# Patient Record
Sex: MALE | Race: WHITE | NOT HISPANIC OR LATINO
[De-identification: names, ages, dates, MRNs, and addresses within clinical notes are randomized per-mention and may not be internally consistent; named-entity substitution may affect disease eponyms.]

---

## 2021-01-05 ENCOUNTER — APPOINTMENT (OUTPATIENT)
Dept: SURGERY | Facility: CLINIC | Age: 61
End: 2021-01-05
Payer: COMMERCIAL

## 2021-01-05 PROCEDURE — 99204 OFFICE O/P NEW MOD 45 MIN: CPT

## 2021-01-05 PROCEDURE — 99072 ADDL SUPL MATRL&STAF TM PHE: CPT

## 2021-01-20 ENCOUNTER — OUTPATIENT (OUTPATIENT)
Dept: OUTPATIENT SERVICES | Facility: HOSPITAL | Age: 61
LOS: 1 days | End: 2021-01-20
Payer: COMMERCIAL

## 2021-01-20 VITALS
OXYGEN SATURATION: 98 % | SYSTOLIC BLOOD PRESSURE: 121 MMHG | HEART RATE: 60 BPM | WEIGHT: 216.05 LBS | TEMPERATURE: 98 F | RESPIRATION RATE: 16 BRPM | DIASTOLIC BLOOD PRESSURE: 81 MMHG

## 2021-01-20 DIAGNOSIS — D21.11 BENIGN NEOPLASM OF CONNECTIVE AND OTHER SOFT TISSUE OF RIGHT UPPER LIMB, INCLUDING SHOULDER: ICD-10-CM

## 2021-01-20 DIAGNOSIS — Z98.890 OTHER SPECIFIED POSTPROCEDURAL STATES: Chronic | ICD-10-CM

## 2021-01-20 DIAGNOSIS — S54.00XA INJURY OF ULNAR NERVE AT FOREARM LEVEL, UNSPECIFIED ARM, INITIAL ENCOUNTER: Chronic | ICD-10-CM

## 2021-01-20 DIAGNOSIS — Z01.818 ENCOUNTER FOR OTHER PREPROCEDURAL EXAMINATION: ICD-10-CM

## 2021-01-20 DIAGNOSIS — G47.33 OBSTRUCTIVE SLEEP APNEA (ADULT) (PEDIATRIC): ICD-10-CM

## 2021-01-20 LAB
ALBUMIN SERPL ELPH-MCNC: 3.8 G/DL — SIGNIFICANT CHANGE UP (ref 3.3–5)
ALP SERPL-CCNC: 52 U/L — SIGNIFICANT CHANGE UP (ref 40–120)
ALT FLD-CCNC: 19 U/L — SIGNIFICANT CHANGE UP (ref 12–78)
ANION GAP SERPL CALC-SCNC: 4 MMOL/L — LOW (ref 5–17)
AST SERPL-CCNC: 15 U/L — SIGNIFICANT CHANGE UP (ref 15–37)
BILIRUB SERPL-MCNC: 0.5 MG/DL — SIGNIFICANT CHANGE UP (ref 0.2–1.2)
BUN SERPL-MCNC: 21 MG/DL — SIGNIFICANT CHANGE UP (ref 7–23)
CALCIUM SERPL-MCNC: 8.8 MG/DL — SIGNIFICANT CHANGE UP (ref 8.5–10.1)
CHLORIDE SERPL-SCNC: 108 MMOL/L — SIGNIFICANT CHANGE UP (ref 96–108)
CO2 SERPL-SCNC: 29 MMOL/L — SIGNIFICANT CHANGE UP (ref 22–31)
CREAT SERPL-MCNC: 0.94 MG/DL — SIGNIFICANT CHANGE UP (ref 0.5–1.3)
GLUCOSE SERPL-MCNC: 103 MG/DL — HIGH (ref 70–99)
HCT VFR BLD CALC: 40.4 % — SIGNIFICANT CHANGE UP (ref 39–50)
HGB BLD-MCNC: 13.8 G/DL — SIGNIFICANT CHANGE UP (ref 13–17)
MCHC RBC-ENTMCNC: 30.1 PG — SIGNIFICANT CHANGE UP (ref 27–34)
MCHC RBC-ENTMCNC: 34.2 GM/DL — SIGNIFICANT CHANGE UP (ref 32–36)
MCV RBC AUTO: 88 FL — SIGNIFICANT CHANGE UP (ref 80–100)
NRBC # BLD: 0 /100 WBCS — SIGNIFICANT CHANGE UP (ref 0–0)
PLATELET # BLD AUTO: 211 K/UL — SIGNIFICANT CHANGE UP (ref 150–400)
POTASSIUM SERPL-MCNC: 4.3 MMOL/L — SIGNIFICANT CHANGE UP (ref 3.5–5.3)
POTASSIUM SERPL-SCNC: 4.3 MMOL/L — SIGNIFICANT CHANGE UP (ref 3.5–5.3)
PROT SERPL-MCNC: 7.3 G/DL — SIGNIFICANT CHANGE UP (ref 6–8.3)
RBC # BLD: 4.59 M/UL — SIGNIFICANT CHANGE UP (ref 4.2–5.8)
RBC # FLD: 12.4 % — SIGNIFICANT CHANGE UP (ref 10.3–14.5)
SODIUM SERPL-SCNC: 141 MMOL/L — SIGNIFICANT CHANGE UP (ref 135–145)
WBC # BLD: 4.73 K/UL — SIGNIFICANT CHANGE UP (ref 3.8–10.5)
WBC # FLD AUTO: 4.73 K/UL — SIGNIFICANT CHANGE UP (ref 3.8–10.5)

## 2021-01-20 PROCEDURE — G0463: CPT

## 2021-01-20 PROCEDURE — 80053 COMPREHEN METABOLIC PANEL: CPT

## 2021-01-20 PROCEDURE — 93010 ELECTROCARDIOGRAM REPORT: CPT

## 2021-01-20 PROCEDURE — 36415 COLL VENOUS BLD VENIPUNCTURE: CPT

## 2021-01-20 PROCEDURE — 85027 COMPLETE CBC AUTOMATED: CPT

## 2021-01-20 PROCEDURE — 93005 ELECTROCARDIOGRAM TRACING: CPT

## 2021-01-20 NOTE — H&P PST ADULT - NSICDXPROBLEM_GEN_ALL_CORE_FT
PROBLEM DIAGNOSES  Problem: Preoperative testing  Assessment and Plan: Nop medical clearance needed as per surgeon. EKG unchaged from 7/2020. Dr Pineda (anesthesiologist) aware. CBC, Comprehensive panel and EKG ordered. Pre-op instructions and surgical scrubs given and pt verbalized understanding. COVID19 PCR testing to be done within 72 hours of surgery at [facility].     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       Problem: DINESH on CPAP  Assessment and Plan: Pt informed of need for extended stay post op if deemed necessary by anesthesiologist. Pt verbalized understanding.

## 2021-01-20 NOTE — H&P PST ADULT - HISTORY OF PRESENT ILLNESS
Pt first noticed "growth behind right shoulder" in 6/2020. Pt s/p sonogram and diagnosed with lipoma. Pt seen by dermatologist and was attempted to be removed in office "but it came back". Pt reports to occasional discomfort when moving right arm. Pt denies pain, infection and drainage from lipoma.  59 y/o male with PMH of DINESH here for PST. Pt first noticed "growth behind right shoulder" in 6/2020. Pt s/p sonogram and diagnosed with lipoma. Pt seen by dermatologist and lipoma was attempted to be removed in office "but it came back". Pt reports to occasional discomfort when moving right arm. Pt denies pain, infection and drainage from lipoma. Pt electing for excision right shoulder mass on 2/3/2021.

## 2021-01-20 NOTE — H&P PST ADULT - GENERAL COMMENTS
Pt denies history of travel outside the country and outside [facility], denies having had the COVID19 infection and denies COVID19 positive contacts within the last 14 days.

## 2021-01-20 NOTE — H&P PST ADULT - NSICDXPASTMEDICALHX_GEN_ALL_CORE_FT
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

## 2021-01-20 NOTE — H&P PST ADULT - SKIN COMMENTS
(+) large mass to posterior right shoulder - moveable, non-tender to palpation, no erythema, no drainage

## 2021-01-20 NOTE — H&P PST ADULT - NSICDXPASTSURGICALHX_GEN_ALL_CORE_FT
PAST SURGICAL HISTORY:  Damage to ulnar nerve (Ulnar nerve transposition, 2019)    S/P ACL reconstruction (Left knee, 2016)    S/P colonoscopy

## 2021-01-29 PROBLEM — G62.9 POLYNEUROPATHY, UNSPECIFIED: Chronic | Status: ACTIVE | Noted: 2021-01-20

## 2021-01-29 PROBLEM — G47.33 OBSTRUCTIVE SLEEP APNEA (ADULT) (PEDIATRIC): Chronic | Status: ACTIVE | Noted: 2021-01-20

## 2021-01-29 PROBLEM — F41.9 ANXIETY DISORDER, UNSPECIFIED: Chronic | Status: ACTIVE | Noted: 2021-01-20

## 2021-01-29 PROBLEM — C61 MALIGNANT NEOPLASM OF PROSTATE: Chronic | Status: ACTIVE | Noted: 2021-01-20

## 2021-01-29 PROBLEM — D21.11 BENIGN NEOPLASM OF CONNECTIVE AND OTHER SOFT TISSUE OF RIGHT UPPER LIMB, INCLUDING SHOULDER: Chronic | Status: ACTIVE | Noted: 2021-01-20

## 2021-01-31 ENCOUNTER — OUTPATIENT (OUTPATIENT)
Dept: OUTPATIENT SERVICES | Facility: HOSPITAL | Age: 61
LOS: 1 days | End: 2021-01-31
Payer: COMMERCIAL

## 2021-01-31 DIAGNOSIS — Z20.828 CONTACT WITH AND (SUSPECTED) EXPOSURE TO OTHER VIRAL COMMUNICABLE DISEASES: ICD-10-CM

## 2021-01-31 DIAGNOSIS — Z98.890 OTHER SPECIFIED POSTPROCEDURAL STATES: Chronic | ICD-10-CM

## 2021-01-31 DIAGNOSIS — S54.00XA INJURY OF ULNAR NERVE AT FOREARM LEVEL, UNSPECIFIED ARM, INITIAL ENCOUNTER: Chronic | ICD-10-CM

## 2021-01-31 LAB — SARS-COV-2 RNA SPEC QL NAA+PROBE: SIGNIFICANT CHANGE UP

## 2021-01-31 PROCEDURE — U0005: CPT

## 2021-01-31 PROCEDURE — U0003: CPT

## 2021-02-02 ENCOUNTER — TRANSCRIPTION ENCOUNTER (OUTPATIENT)
Age: 61
End: 2021-02-02

## 2021-02-02 NOTE — ASU PATIENT PROFILE, ADULT - PSH
Damage to ulnar nerve  (Ulnar nerve transposition, 2019)  S/P ACL reconstruction  (Left knee, 2016)  S/P colonoscopy

## 2021-02-02 NOTE — ASU PATIENT PROFILE, ADULT - PMH
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

## 2021-02-03 ENCOUNTER — APPOINTMENT (OUTPATIENT)
Dept: SURGERY | Facility: HOSPITAL | Age: 61
End: 2021-02-03
Payer: COMMERCIAL

## 2021-02-03 ENCOUNTER — OUTPATIENT (OUTPATIENT)
Dept: OUTPATIENT SERVICES | Facility: HOSPITAL | Age: 61
LOS: 1 days | End: 2021-02-03
Payer: COMMERCIAL

## 2021-02-03 ENCOUNTER — RESULT REVIEW (OUTPATIENT)
Age: 61
End: 2021-02-03

## 2021-02-03 VITALS
OXYGEN SATURATION: 97 % | SYSTOLIC BLOOD PRESSURE: 116 MMHG | HEART RATE: 70 BPM | RESPIRATION RATE: 14 BRPM | TEMPERATURE: 98 F | WEIGHT: 216.05 LBS | DIASTOLIC BLOOD PRESSURE: 73 MMHG

## 2021-02-03 VITALS
OXYGEN SATURATION: 97 % | DIASTOLIC BLOOD PRESSURE: 78 MMHG | SYSTOLIC BLOOD PRESSURE: 140 MMHG | RESPIRATION RATE: 14 BRPM | HEART RATE: 67 BPM

## 2021-02-03 DIAGNOSIS — D21.11 BENIGN NEOPLASM OF CONNECTIVE AND OTHER SOFT TISSUE OF RIGHT UPPER LIMB, INCLUDING SHOULDER: ICD-10-CM

## 2021-02-03 DIAGNOSIS — Z98.890 OTHER SPECIFIED POSTPROCEDURAL STATES: Chronic | ICD-10-CM

## 2021-02-03 DIAGNOSIS — S54.00XA INJURY OF ULNAR NERVE AT FOREARM LEVEL, UNSPECIFIED ARM, INITIAL ENCOUNTER: Chronic | ICD-10-CM

## 2021-02-03 DIAGNOSIS — Z01.818 ENCOUNTER FOR OTHER PREPROCEDURAL EXAMINATION: ICD-10-CM

## 2021-02-03 PROCEDURE — 88304 TISSUE EXAM BY PATHOLOGIST: CPT

## 2021-02-03 PROCEDURE — 23073 EXC SHOULDER TUM DEEP 5 CM/>: CPT | Mod: RT

## 2021-02-03 PROCEDURE — 88304 TISSUE EXAM BY PATHOLOGIST: CPT | Mod: 26

## 2021-02-03 PROCEDURE — ZZZZZ: CPT

## 2021-02-03 PROCEDURE — 99072 ADDL SUPL MATRL&STAF TM PHE: CPT

## 2021-02-03 RX ORDER — HYDROMORPHONE HYDROCHLORIDE 2 MG/ML
0.5 INJECTION INTRAMUSCULAR; INTRAVENOUS; SUBCUTANEOUS
Refills: 0 | Status: DISCONTINUED | OUTPATIENT
Start: 2021-02-03 | End: 2021-02-03

## 2021-02-03 RX ORDER — SODIUM CHLORIDE 9 MG/ML
1000 INJECTION, SOLUTION INTRAVENOUS
Refills: 0 | Status: DISCONTINUED | OUTPATIENT
Start: 2021-02-03 | End: 2021-02-03

## 2021-02-03 RX ORDER — HYDROCODONE BITARTRATE AND ACETAMINOPHEN 7.5; 325 MG/15ML; MG/15ML
1 SOLUTION ORAL
Qty: 12 | Refills: 0
Start: 2021-02-03

## 2021-02-03 RX ORDER — OXYCODONE HYDROCHLORIDE 5 MG/1
5 TABLET ORAL ONCE
Refills: 0 | Status: DISCONTINUED | OUTPATIENT
Start: 2021-02-03 | End: 2021-02-03

## 2021-02-03 RX ORDER — CEFAZOLIN SODIUM 1 G
1000 VIAL (EA) INJECTION ONCE
Refills: 0 | Status: COMPLETED | OUTPATIENT
Start: 2021-02-03 | End: 2021-02-03

## 2021-02-03 RX ORDER — ONDANSETRON 8 MG/1
4 TABLET, FILM COATED ORAL ONCE
Refills: 0 | Status: DISCONTINUED | OUTPATIENT
Start: 2021-02-03 | End: 2021-02-03

## 2021-02-03 RX ADMIN — SODIUM CHLORIDE 75 MILLILITER(S): 9 INJECTION, SOLUTION INTRAVENOUS at 11:44

## 2021-02-03 RX ADMIN — SODIUM CHLORIDE 75 MILLILITER(S): 9 INJECTION, SOLUTION INTRAVENOUS at 14:19

## 2021-02-03 NOTE — ASU DISCHARGE PLAN (ADULT/PEDIATRIC) - CALL YOUR DOCTOR IF YOU HAVE ANY OF THE FOLLOWING:
Bleeding that does not stop/Pain not relieved by Medications/Nausea and vomiting that does not stop/Unable to urinate/Inability to tolerate liquids or foods

## 2021-02-03 NOTE — ASU DISCHARGE PLAN (ADULT/PEDIATRIC) - CARE PROVIDER_API CALL
Kd Serrano (DO)  Surgery  [address]  [place], [place] [id number]  Phone: [phone]  Fax: [phone]  Follow Up Time: 1 week

## 2021-02-03 NOTE — BRIEF OPERATIVE NOTE - NSICDXBRIEFOPLAUNCH_GEN_ALL_CORE
Arterial    Diagnosis: a-fib    Patient location during procedure: done in OR  Procedure start time: 9/30/2019 1:56 PM  Timeout: 9/30/2019 1:55 PM  Procedure end time: 9/30/2019 2:01 PM    Staffing  Authorizing Provider: Foreign Louis Jr., MD  Performing Provider: Foreign Louis Jr., MD    Anesthesiologist was present at the time of the procedure.    Preanesthetic Checklist  Completed: patient identified, site marked, surgical consent, pre-op evaluation, timeout performed, IV checked, risks and benefits discussed, monitors and equipment checked and anesthesia consent givenArterial  Skin Prep: chlorhexidine gluconate  Local Infiltration: none  Orientation: right  Location: radial  Catheter Size: 20 G  Catheter placement by Anatomical landmarks. Heme positive aspiration all ports.Insertion Attempts: 1  Assessment  Dressing: secured with tape and tegaderm  Patient: Tolerated well            
<--- Click to Launch ICDx for PreOp, PostOp and Procedure

## 2021-02-08 LAB — SURGICAL PATHOLOGY STUDY: SIGNIFICANT CHANGE UP

## 2021-02-11 ENCOUNTER — APPOINTMENT (OUTPATIENT)
Dept: SURGERY | Facility: CLINIC | Age: 61
End: 2021-02-11

## 2021-05-09 ENCOUNTER — EMERGENCY (EMERGENCY)
Facility: HOSPITAL | Age: 61
LOS: 1 days | Discharge: HOME | End: 2021-05-09
Attending: INTERNAL MEDICINE | Admitting: INTERNAL MEDICINE
Payer: COMMERCIAL

## 2021-05-09 VITALS
SYSTOLIC BLOOD PRESSURE: 143 MMHG | DIASTOLIC BLOOD PRESSURE: 72 MMHG | RESPIRATION RATE: 18 BRPM | OXYGEN SATURATION: 99 % | WEIGHT: 214.95 LBS | HEART RATE: 67 BPM

## 2021-05-09 VITALS
SYSTOLIC BLOOD PRESSURE: 127 MMHG | OXYGEN SATURATION: 100 % | DIASTOLIC BLOOD PRESSURE: 70 MMHG | TEMPERATURE: 97 F | HEART RATE: 58 BPM | RESPIRATION RATE: 16 BRPM

## 2021-05-09 DIAGNOSIS — S54.00XA INJURY OF ULNAR NERVE AT FOREARM LEVEL, UNSPECIFIED ARM, INITIAL ENCOUNTER: Chronic | ICD-10-CM

## 2021-05-09 DIAGNOSIS — Z98.890 OTHER SPECIFIED POSTPROCEDURAL STATES: Chronic | ICD-10-CM

## 2021-05-09 LAB
ALBUMIN SERPL ELPH-MCNC: 4 G/DL — SIGNIFICANT CHANGE UP (ref 3.3–5)
ALP SERPL-CCNC: 48 U/L — SIGNIFICANT CHANGE UP (ref 40–120)
ALT FLD-CCNC: 20 U/L — SIGNIFICANT CHANGE UP (ref 12–78)
ANION GAP SERPL CALC-SCNC: 6 MMOL/L — SIGNIFICANT CHANGE UP (ref 5–17)
APPEARANCE UR: CLEAR — SIGNIFICANT CHANGE UP
AST SERPL-CCNC: 16 U/L — SIGNIFICANT CHANGE UP (ref 15–37)
BACTERIA # UR AUTO: NEGATIVE — SIGNIFICANT CHANGE UP
BILIRUB SERPL-MCNC: 0.4 MG/DL — SIGNIFICANT CHANGE UP (ref 0.2–1.2)
BILIRUB UR-MCNC: NEGATIVE — SIGNIFICANT CHANGE UP
BUN SERPL-MCNC: 22 MG/DL — SIGNIFICANT CHANGE UP (ref 7–23)
CALCIUM SERPL-MCNC: 8.8 MG/DL — SIGNIFICANT CHANGE UP (ref 8.5–10.1)
CHLORIDE SERPL-SCNC: 108 MMOL/L — SIGNIFICANT CHANGE UP (ref 96–108)
CO2 SERPL-SCNC: 26 MMOL/L — SIGNIFICANT CHANGE UP (ref 22–31)
COLOR SPEC: YELLOW — SIGNIFICANT CHANGE UP
CREAT SERPL-MCNC: 1.4 MG/DL — HIGH (ref 0.5–1.3)
DIFF PNL FLD: ABNORMAL
EPI CELLS # UR: NEGATIVE — SIGNIFICANT CHANGE UP
GLUCOSE SERPL-MCNC: 138 MG/DL — HIGH (ref 70–99)
GLUCOSE UR QL: NEGATIVE — SIGNIFICANT CHANGE UP
HCT VFR BLD CALC: 42.2 % — SIGNIFICANT CHANGE UP (ref 39–50)
HGB BLD-MCNC: 14.5 G/DL — SIGNIFICANT CHANGE UP (ref 13–17)
KETONES UR-MCNC: NEGATIVE — SIGNIFICANT CHANGE UP
LEUKOCYTE ESTERASE UR-ACNC: NEGATIVE — SIGNIFICANT CHANGE UP
MCHC RBC-ENTMCNC: 29.6 PG — SIGNIFICANT CHANGE UP (ref 27–34)
MCHC RBC-ENTMCNC: 34.4 GM/DL — SIGNIFICANT CHANGE UP (ref 32–36)
MCV RBC AUTO: 86.1 FL — SIGNIFICANT CHANGE UP (ref 80–100)
NITRITE UR-MCNC: NEGATIVE — SIGNIFICANT CHANGE UP
NRBC # BLD: 0 /100 WBCS — SIGNIFICANT CHANGE UP (ref 0–0)
PH UR: 5 — SIGNIFICANT CHANGE UP (ref 5–8)
PLATELET # BLD AUTO: 194 K/UL — SIGNIFICANT CHANGE UP (ref 150–400)
POTASSIUM SERPL-MCNC: 3.9 MMOL/L — SIGNIFICANT CHANGE UP (ref 3.5–5.3)
POTASSIUM SERPL-SCNC: 3.9 MMOL/L — SIGNIFICANT CHANGE UP (ref 3.5–5.3)
PROT SERPL-MCNC: 7.2 G/DL — SIGNIFICANT CHANGE UP (ref 6–8.3)
PROT UR-MCNC: 15
RBC # BLD: 4.9 M/UL — SIGNIFICANT CHANGE UP (ref 4.2–5.8)
RBC # FLD: 12.3 % — SIGNIFICANT CHANGE UP (ref 10.3–14.5)
RBC CASTS # UR COMP ASSIST: ABNORMAL /HPF (ref 0–4)
SODIUM SERPL-SCNC: 140 MMOL/L — SIGNIFICANT CHANGE UP (ref 135–145)
SP GR SPEC: 1.02 — SIGNIFICANT CHANGE UP (ref 1.01–1.02)
UROBILINOGEN FLD QL: NEGATIVE — SIGNIFICANT CHANGE UP
WBC # BLD: 11.05 K/UL — HIGH (ref 3.8–10.5)
WBC # FLD AUTO: 11.05 K/UL — HIGH (ref 3.8–10.5)
WBC UR QL: SIGNIFICANT CHANGE UP

## 2021-05-09 PROCEDURE — 81001 URINALYSIS AUTO W/SCOPE: CPT

## 2021-05-09 PROCEDURE — 99285 EMERGENCY DEPT VISIT HI MDM: CPT

## 2021-05-09 PROCEDURE — 74176 CT ABD & PELVIS W/O CONTRAST: CPT | Mod: 26,MA

## 2021-05-09 PROCEDURE — 74176 CT ABD & PELVIS W/O CONTRAST: CPT

## 2021-05-09 PROCEDURE — 99284 EMERGENCY DEPT VISIT MOD MDM: CPT | Mod: 25

## 2021-05-09 PROCEDURE — 85027 COMPLETE CBC AUTOMATED: CPT

## 2021-05-09 PROCEDURE — 96376 TX/PRO/DX INJ SAME DRUG ADON: CPT

## 2021-05-09 PROCEDURE — 80053 COMPREHEN METABOLIC PANEL: CPT

## 2021-05-09 PROCEDURE — 36415 COLL VENOUS BLD VENIPUNCTURE: CPT

## 2021-05-09 PROCEDURE — 96374 THER/PROPH/DIAG INJ IV PUSH: CPT

## 2021-05-09 PROCEDURE — 96375 TX/PRO/DX INJ NEW DRUG ADDON: CPT

## 2021-05-09 PROCEDURE — 99053 MED SERV 10PM-8AM 24 HR FAC: CPT

## 2021-05-09 RX ORDER — HYDROMORPHONE HYDROCHLORIDE 2 MG/ML
0.5 INJECTION INTRAMUSCULAR; INTRAVENOUS; SUBCUTANEOUS ONCE
Refills: 0 | Status: DISCONTINUED | OUTPATIENT
Start: 2021-05-09 | End: 2021-05-09

## 2021-05-09 RX ORDER — TAMSULOSIN HYDROCHLORIDE 0.4 MG/1
1 CAPSULE ORAL
Qty: 30 | Refills: 0
Start: 2021-05-09 | End: 2021-06-07

## 2021-05-09 RX ORDER — KETOROLAC TROMETHAMINE 30 MG/ML
30 SYRINGE (ML) INJECTION ONCE
Refills: 0 | Status: DISCONTINUED | OUTPATIENT
Start: 2021-05-09 | End: 2021-05-09

## 2021-05-09 RX ORDER — OXYCODONE AND ACETAMINOPHEN 5; 325 MG/1; MG/1
1 TABLET ORAL
Qty: 12 | Refills: 0
Start: 2021-05-09 | End: 2021-05-11

## 2021-05-09 RX ORDER — ONDANSETRON 8 MG/1
4 TABLET, FILM COATED ORAL ONCE
Refills: 0 | Status: COMPLETED | OUTPATIENT
Start: 2021-05-09 | End: 2021-05-09

## 2021-05-09 RX ORDER — SODIUM CHLORIDE 9 MG/ML
1000 INJECTION INTRAMUSCULAR; INTRAVENOUS; SUBCUTANEOUS ONCE
Refills: 0 | Status: COMPLETED | OUTPATIENT
Start: 2021-05-09 | End: 2021-05-09

## 2021-05-09 RX ORDER — CEFUROXIME AXETIL 250 MG
1 TABLET ORAL
Qty: 20 | Refills: 0
Start: 2021-05-09 | End: 2021-05-18

## 2021-05-09 RX ORDER — TAMSULOSIN HYDROCHLORIDE 0.4 MG/1
0.4 CAPSULE ORAL AT BEDTIME
Refills: 0 | Status: DISCONTINUED | OUTPATIENT
Start: 2021-05-09 | End: 2021-05-12

## 2021-05-09 RX ADMIN — SODIUM CHLORIDE 1000 MILLILITER(S): 9 INJECTION INTRAMUSCULAR; INTRAVENOUS; SUBCUTANEOUS at 05:54

## 2021-05-09 RX ADMIN — Medication 30 MILLIGRAM(S): at 05:41

## 2021-05-09 RX ADMIN — HYDROMORPHONE HYDROCHLORIDE 0.5 MILLIGRAM(S): 2 INJECTION INTRAMUSCULAR; INTRAVENOUS; SUBCUTANEOUS at 04:43

## 2021-05-09 RX ADMIN — Medication 30 MILLIGRAM(S): at 06:03

## 2021-05-09 RX ADMIN — HYDROMORPHONE HYDROCHLORIDE 0.5 MILLIGRAM(S): 2 INJECTION INTRAMUSCULAR; INTRAVENOUS; SUBCUTANEOUS at 05:02

## 2021-05-09 RX ADMIN — TAMSULOSIN HYDROCHLORIDE 0.4 MILLIGRAM(S): 0.4 CAPSULE ORAL at 06:01

## 2021-05-09 RX ADMIN — SODIUM CHLORIDE 1000 MILLILITER(S): 9 INJECTION INTRAMUSCULAR; INTRAVENOUS; SUBCUTANEOUS at 04:43

## 2021-05-09 RX ADMIN — ONDANSETRON 4 MILLIGRAM(S): 8 TABLET, FILM COATED ORAL at 05:41

## 2021-05-09 RX ADMIN — ONDANSETRON 4 MILLIGRAM(S): 8 TABLET, FILM COATED ORAL at 04:43

## 2021-05-09 NOTE — ED PROVIDER NOTE - PATIENT PORTAL LINK FT
You can access the FollowMyHealth Patient Portal offered by [facility] by registering at the following website: [email or web address]. By joining [facility]’s FollowMyHealth portal, you will also be able to view your health information using other applications (apps) compatible with our system.

## 2021-05-09 NOTE — ED PROVIDER NOTE - CARE PROVIDER_API CALL
Kurt Willett  UROLOGY  [address], [address]  [place] [address]  Phone: [phone]  Fax: [phone]  Follow Up Time: 1-3 Days

## 2021-05-09 NOTE — ED ADULT NURSE NOTE - OBJECTIVE STATEMENT
Pt comes from triage. Pt reports right flank pain since 6:30pm with vomiting and reports of dribbling urine. Pt breathing shallow due to pain. Pt ambulatory with a steady gait. Pt bladder scanned for 14ml present.

## 2021-05-09 NOTE — ED PROVIDER NOTE - OBJECTIVE STATEMENT
62 y/o male with right flank pain , back pain at 6PM that intensified this morning with nausea, no vomiting, no fever, no chills, no urinary symptoms.

## 2021-05-09 NOTE — ED PROVIDER NOTE - NSFOLLOWUPINSTRUCTIONS_ED_ALL_ED_FT
1) Follow-up with your Primary Medical Doctor or referred doctor. Call today / next business day for prompt follow-up.  2) Return to Emergency room for any worsening or persistent pain, weakness,  vomiting, diarrhea, unable to eat / drink, weak or dizzy, or any other concerning symptoms.   -If you have ANY FEVER, you must return to ED immediately  3) See attached instruction sheets for additional information, including information regarding signs and symptoms to look out for, reasons to seek immediate care and other important instructions.  4) Follow-up with Urology, see attached. Call ASAP for appointment.  5) Ceftin antibiotic and FLOMAX as directed   6) Percocet as needed for pain, no driving / operating heavy machinery

## 2021-08-05 ENCOUNTER — TRANSCRIPTION ENCOUNTER (OUTPATIENT)
Age: 61
End: 2021-08-05

## 2022-05-17 NOTE — H&P PST ADULT - SOURCE OF INFORMATION, PROFILE
Patient currently denies any COVID symptoms. No COVID test needed. Patient instructed to call GI office if any new COVID symptoms or contacts to Mary before procedure.
patient

## 2022-10-03 NOTE — ED ADULT NURSE NOTE - CHPI ED NUR SYMPTOMS NEG
Caller: JAMAALEDDIE    Relationship: Emergency Contact    Best call back number: [phone]    Requested Prescriptions: GLUCOSE TEST STRIPS    [id number] [address] [place], [place] [address] AT [facility]  [place] - [phone]  - [phone] FX     Additional details provided by patient: THE PATIENT'S SON STATES THAT A GLUCOSE MONITOR WAS CALLED IN FOR THE PATIENT, BUT THE TEST STRIPS WERE NOT. PLEASE ADVISE.     Does the patient have less than a 3 day supply:  [x] Yes  [] No    Ac Gerardo Rep   10/03/22 12:20 EDT   no abdominal distension/no blood in stool/no burning urination/no chills/no diarrhea/no fever/no hematuria

## 2022-12-24 ENCOUNTER — INPATIENT (INPATIENT)
Facility: HOSPITAL | Age: 62
LOS: 1 days | Discharge: HOME | DRG: 69 | End: 2022-12-26
Attending: STUDENT IN AN ORGANIZED HEALTH CARE EDUCATION/TRAINING PROGRAM | Admitting: STUDENT IN AN ORGANIZED HEALTH CARE EDUCATION/TRAINING PROGRAM
Payer: COMMERCIAL

## 2022-12-24 VITALS
RESPIRATION RATE: 17 BRPM | DIASTOLIC BLOOD PRESSURE: 53 MMHG | OXYGEN SATURATION: 99 % | HEIGHT: 74 IN | WEIGHT: 214.95 LBS | SYSTOLIC BLOOD PRESSURE: 116 MMHG | TEMPERATURE: 97 F | HEART RATE: 85 BPM

## 2022-12-24 DIAGNOSIS — G45.9 TRANSIENT CEREBRAL ISCHEMIC ATTACK, UNSPECIFIED: ICD-10-CM

## 2022-12-24 DIAGNOSIS — F41.9 ANXIETY DISORDER, UNSPECIFIED: ICD-10-CM

## 2022-12-24 DIAGNOSIS — S62.001A UNSPECIFIED FRACTURE OF NAVICULAR [SCAPHOID] BONE OF RIGHT WRIST, INITIAL ENCOUNTER FOR CLOSED FRACTURE: Chronic | ICD-10-CM

## 2022-12-24 DIAGNOSIS — M19.90 UNSPECIFIED OSTEOARTHRITIS, UNSPECIFIED SITE: ICD-10-CM

## 2022-12-24 DIAGNOSIS — Z98.890 OTHER SPECIFIED POSTPROCEDURAL STATES: Chronic | ICD-10-CM

## 2022-12-24 DIAGNOSIS — G47.33 OBSTRUCTIVE SLEEP APNEA (ADULT) (PEDIATRIC): ICD-10-CM

## 2022-12-24 DIAGNOSIS — R73.9 HYPERGLYCEMIA, UNSPECIFIED: ICD-10-CM

## 2022-12-24 DIAGNOSIS — S54.00XA INJURY OF ULNAR NERVE AT FOREARM LEVEL, UNSPECIFIED ARM, INITIAL ENCOUNTER: Chronic | ICD-10-CM

## 2022-12-24 DIAGNOSIS — Z29.9 ENCOUNTER FOR PROPHYLACTIC MEASURES, UNSPECIFIED: ICD-10-CM

## 2022-12-24 LAB
ALBUMIN SERPL ELPH-MCNC: 3.8 G/DL — SIGNIFICANT CHANGE UP (ref 3.3–5)
ALP SERPL-CCNC: 49 U/L — SIGNIFICANT CHANGE UP (ref 40–120)
ALT FLD-CCNC: 21 U/L — SIGNIFICANT CHANGE UP (ref 12–78)
AMPHET UR-MCNC: NEGATIVE — SIGNIFICANT CHANGE UP
ANION GAP SERPL CALC-SCNC: 5 MMOL/L — SIGNIFICANT CHANGE UP (ref 5–17)
APPEARANCE UR: CLEAR — SIGNIFICANT CHANGE UP
APTT BLD: 36.7 SEC — HIGH (ref 27.5–35.5)
AST SERPL-CCNC: 17 U/L — SIGNIFICANT CHANGE UP (ref 15–37)
BARBITURATES UR SCN-MCNC: NEGATIVE — SIGNIFICANT CHANGE UP
BASOPHILS # BLD AUTO: 0.03 K/UL — SIGNIFICANT CHANGE UP (ref 0–0.2)
BASOPHILS NFR BLD AUTO: 0.5 % — SIGNIFICANT CHANGE UP (ref 0–2)
BENZODIAZ UR-MCNC: NEGATIVE — SIGNIFICANT CHANGE UP
BILIRUB SERPL-MCNC: 0.3 MG/DL — SIGNIFICANT CHANGE UP (ref 0.2–1.2)
BILIRUB UR-MCNC: NEGATIVE — SIGNIFICANT CHANGE UP
BLD GP AB SCN SERPL QL: SIGNIFICANT CHANGE UP
BUN SERPL-MCNC: 20 MG/DL — SIGNIFICANT CHANGE UP (ref 7–23)
CALCIUM SERPL-MCNC: 9.1 MG/DL — SIGNIFICANT CHANGE UP (ref 8.5–10.1)
CHLORIDE SERPL-SCNC: 109 MMOL/L — HIGH (ref 96–108)
CO2 SERPL-SCNC: 28 MMOL/L — SIGNIFICANT CHANGE UP (ref 22–31)
COCAINE METAB.OTHER UR-MCNC: NEGATIVE — SIGNIFICANT CHANGE UP
COLOR SPEC: SIGNIFICANT CHANGE UP
CREAT SERPL-MCNC: 1.1 MG/DL — SIGNIFICANT CHANGE UP (ref 0.5–1.3)
DIFF PNL FLD: NEGATIVE — SIGNIFICANT CHANGE UP
EGFR: 76 ML/MIN/1.73M2 — SIGNIFICANT CHANGE UP
EOSINOPHIL # BLD AUTO: 0.21 K/UL — SIGNIFICANT CHANGE UP (ref 0–0.5)
EOSINOPHIL NFR BLD AUTO: 3.7 % — SIGNIFICANT CHANGE UP (ref 0–6)
ETHANOL SERPL-MCNC: <10 MG/DL — SIGNIFICANT CHANGE UP (ref 0–10)
FLUAV AG NPH QL: SIGNIFICANT CHANGE UP
FLUBV AG NPH QL: SIGNIFICANT CHANGE UP
GLUCOSE SERPL-MCNC: 120 MG/DL — HIGH (ref 70–99)
GLUCOSE UR QL: NEGATIVE — SIGNIFICANT CHANGE UP
HCT VFR BLD CALC: 41.2 % — SIGNIFICANT CHANGE UP (ref 39–50)
HGB BLD-MCNC: 14.2 G/DL — SIGNIFICANT CHANGE UP (ref 13–17)
IMM GRANULOCYTES NFR BLD AUTO: 0.3 % — SIGNIFICANT CHANGE UP (ref 0–0.9)
INR BLD: 1.03 RATIO — SIGNIFICANT CHANGE UP (ref 0.88–1.16)
KETONES UR-MCNC: NEGATIVE — SIGNIFICANT CHANGE UP
LEUKOCYTE ESTERASE UR-ACNC: NEGATIVE — SIGNIFICANT CHANGE UP
LYMPHOCYTES # BLD AUTO: 1.5 K/UL — SIGNIFICANT CHANGE UP (ref 1–3.3)
LYMPHOCYTES # BLD AUTO: 26.1 % — SIGNIFICANT CHANGE UP (ref 13–44)
MCHC RBC-ENTMCNC: 30 PG — SIGNIFICANT CHANGE UP (ref 27–34)
MCHC RBC-ENTMCNC: 34.5 GM/DL — SIGNIFICANT CHANGE UP (ref 32–36)
MCV RBC AUTO: 87.1 FL — SIGNIFICANT CHANGE UP (ref 80–100)
METHADONE UR-MCNC: NEGATIVE — SIGNIFICANT CHANGE UP
MONOCYTES # BLD AUTO: 0.55 K/UL — SIGNIFICANT CHANGE UP (ref 0–0.9)
MONOCYTES NFR BLD AUTO: 9.6 % — SIGNIFICANT CHANGE UP (ref 2–14)
NEUTROPHILS # BLD AUTO: 3.43 K/UL — SIGNIFICANT CHANGE UP (ref 1.8–7.4)
NEUTROPHILS NFR BLD AUTO: 59.8 % — SIGNIFICANT CHANGE UP (ref 43–77)
NITRITE UR-MCNC: NEGATIVE — SIGNIFICANT CHANGE UP
NRBC # BLD: 0 /100 WBCS — SIGNIFICANT CHANGE UP (ref 0–0)
OPIATES UR-MCNC: NEGATIVE — SIGNIFICANT CHANGE UP
PCP SPEC-MCNC: SIGNIFICANT CHANGE UP
PCP UR-MCNC: NEGATIVE — SIGNIFICANT CHANGE UP
PH UR: 5 — SIGNIFICANT CHANGE UP (ref 5–8)
PLATELET # BLD AUTO: 187 K/UL — SIGNIFICANT CHANGE UP (ref 150–400)
POTASSIUM SERPL-MCNC: 4.1 MMOL/L — SIGNIFICANT CHANGE UP (ref 3.5–5.3)
POTASSIUM SERPL-SCNC: 4.1 MMOL/L — SIGNIFICANT CHANGE UP (ref 3.5–5.3)
PROT SERPL-MCNC: 7.3 G/DL — SIGNIFICANT CHANGE UP (ref 6–8.3)
PROT UR-MCNC: NEGATIVE — SIGNIFICANT CHANGE UP
PROTHROM AB SERPL-ACNC: 12.1 SEC — SIGNIFICANT CHANGE UP (ref 10.5–13.4)
RBC # BLD: 4.73 M/UL — SIGNIFICANT CHANGE UP (ref 4.2–5.8)
RBC # FLD: 12.2 % — SIGNIFICANT CHANGE UP (ref 10.3–14.5)
RSV RNA NPH QL NAA+NON-PROBE: SIGNIFICANT CHANGE UP
SARS-COV-2 RNA SPEC QL NAA+PROBE: SIGNIFICANT CHANGE UP
SODIUM SERPL-SCNC: 142 MMOL/L — SIGNIFICANT CHANGE UP (ref 135–145)
SP GR SPEC: 1.01 — SIGNIFICANT CHANGE UP (ref 1.01–1.02)
THC UR QL: NEGATIVE — SIGNIFICANT CHANGE UP
TROPONIN I, HIGH SENSITIVITY RESULT: 4.9 NG/L — SIGNIFICANT CHANGE UP
UROBILINOGEN FLD QL: NEGATIVE — SIGNIFICANT CHANGE UP
WBC # BLD: 5.74 K/UL — SIGNIFICANT CHANGE UP (ref 3.8–10.5)
WBC # FLD AUTO: 5.74 K/UL — SIGNIFICANT CHANGE UP (ref 3.8–10.5)

## 2022-12-24 PROCEDURE — 99223 1ST HOSP IP/OBS HIGH 75: CPT | Mod: GC

## 2022-12-24 PROCEDURE — 99285 EMERGENCY DEPT VISIT HI MDM: CPT

## 2022-12-24 PROCEDURE — 70498 CT ANGIOGRAPHY NECK: CPT | Mod: 26,MA

## 2022-12-24 PROCEDURE — 93010 ELECTROCARDIOGRAM REPORT: CPT

## 2022-12-24 PROCEDURE — 0042T: CPT | Mod: MA

## 2022-12-24 PROCEDURE — 71045 X-RAY EXAM CHEST 1 VIEW: CPT | Mod: 26

## 2022-12-24 PROCEDURE — 70496 CT ANGIOGRAPHY HEAD: CPT | Mod: 26,MA

## 2022-12-24 RX ORDER — SERTRALINE 25 MG/1
50 TABLET, FILM COATED ORAL DAILY
Refills: 0 | Status: DISCONTINUED | OUTPATIENT
Start: 2022-12-24 | End: 2022-12-26

## 2022-12-24 RX ORDER — ATORVASTATIN CALCIUM 80 MG/1
40 TABLET, FILM COATED ORAL AT BEDTIME
Refills: 0 | Status: DISCONTINUED | OUTPATIENT
Start: 2022-12-24 | End: 2022-12-26

## 2022-12-24 RX ORDER — ENOXAPARIN SODIUM 100 MG/ML
40 INJECTION SUBCUTANEOUS EVERY 24 HOURS
Refills: 0 | Status: DISCONTINUED | OUTPATIENT
Start: 2022-12-24 | End: 2022-12-26

## 2022-12-24 RX ORDER — GABAPENTIN 400 MG/1
300 CAPSULE ORAL
Qty: 0 | Refills: 0 | DISCHARGE

## 2022-12-24 RX ORDER — ONDANSETRON 8 MG/1
4 TABLET, FILM COATED ORAL EVERY 8 HOURS
Refills: 0 | Status: DISCONTINUED | OUTPATIENT
Start: 2022-12-24 | End: 2022-12-26

## 2022-12-24 RX ORDER — GABAPENTIN 400 MG/1
300 CAPSULE ORAL
Refills: 0 | Status: DISCONTINUED | OUTPATIENT
Start: 2022-12-24 | End: 2022-12-26

## 2022-12-24 RX ORDER — ACETAMINOPHEN 500 MG
650 TABLET ORAL EVERY 6 HOURS
Refills: 0 | Status: DISCONTINUED | OUTPATIENT
Start: 2022-12-24 | End: 2022-12-24

## 2022-12-24 RX ORDER — LANOLIN ALCOHOL/MO/W.PET/CERES
3 CREAM (GRAM) TOPICAL AT BEDTIME
Refills: 0 | Status: DISCONTINUED | OUTPATIENT
Start: 2022-12-24 | End: 2022-12-24

## 2022-12-24 RX ORDER — ASPIRIN/CALCIUM CARB/MAGNESIUM 324 MG
324 TABLET ORAL ONCE
Refills: 0 | Status: COMPLETED | OUTPATIENT
Start: 2022-12-24 | End: 2022-12-24

## 2022-12-24 RX ORDER — GABAPENTIN 400 MG/1
0 CAPSULE ORAL
Qty: 0 | Refills: 0 | DISCHARGE

## 2022-12-24 RX ORDER — ASPIRIN/CALCIUM CARB/MAGNESIUM 324 MG
81 TABLET ORAL DAILY
Refills: 0 | Status: DISCONTINUED | OUTPATIENT
Start: 2022-12-25 | End: 2022-12-26

## 2022-12-24 RX ORDER — CELECOXIB 200 MG/1
200 CAPSULE ORAL DAILY
Refills: 0 | Status: DISCONTINUED | OUTPATIENT
Start: 2022-12-24 | End: 2022-12-26

## 2022-12-24 NOTE — ED ADULT NURSE NOTE - PERIPHERAL VASCULAR
Aria Carballo     - - -

## 2022-12-24 NOTE — H&P ADULT - HISTORY OF PRESENT ILLNESS
IN THE ED:  Temp  97.3 F , HR 85 ,  / 53 ,RR 17 , SpO2 99 on RA  EKG:  Labs unremarkable  Imaging:   CT head non con: No large territory acute infarct, intracranial hemorrhage, or mass effect. Mild chronic microangiopathic white matter changes present.  CTA HEAD/NECK: No large vessel occlusion, aneurysm, dissection, or hemodynamically flow-limiting stenosis present.  CT PERFUSION: Limited by artifact. To the visualized extent, no convincing evidence of core infarct or ischemic penumbra. Subtle patchy ground-glass opacities at the bilateral apices, limited motion artifact, although may represent early infiltrative/infectious process.   63 y/o M w/ PMHx of osteoarthritis, anxiety, prostate cancer s/p radiation trx, and multiple orthopedic surgeries/chronic issues presented to the ED prompted by family members endorsing gait instability, difficulty standing up, and slurred speech. Patient was at a family holiday party when family members noticed these changes. Per patient he arrived at this family party around 4pm and symptoms were noticed when arising from a chair at around 4:30 PM. Patient fell well and wanted to just rest it off, but an EMS family member took the patient's BP and noted systolic 180s. Per ED note patient was last seen well by his wife at 3 PM. Brought into hospital by wife. NIHSS stroke scale in ED 0, and all symptoms showed steady improvement. Upon admission patient with complete symptom resolution, no slurring, normal gait, reports feeling well and endorses no complaints. This constellation of symptoms is a first time for the patient. Upon further discussion patient reports feelings of unsteadiness in the days leading up to admission as well as an intermittent left sided whooshing sound in his left ear only noticeable in a quiet room. Patient endorses some left sided chest pain in the past several weeks though not on exertion, patient works out 3 times a week with no CP on exertion. Patient denies current CP, SOB, palpitations weakness/numbness/tingling, sensory deficits.     IN THE ED:  Temp  97.3 F , HR 85 ,  / 53 ,RR 17 , SpO2 99 on RA  EKG: NSR. Rate 74. RBBB.  Labs unremarkable  Imaging:   CT head non con: No large territory acute infarct, intracranial hemorrhage, or mass effect. Mild chronic microangiopathic white matter changes present.  CTA HEAD/NECK: No large vessel occlusion, aneurysm, dissection, or hemodynamically flow-limiting stenosis present.  CT PERFUSION: Limited by artifact. To the visualized extent, no convincing evidence of core infarct or ischemic penumbra. Subtle patchy ground-glass opacities at the bilateral apices, limited motion artifact, although may represent early infiltrative/infectious process.   61 y/o M w/ PMHx of osteoarthritis, anxiety, prostate cancer s/p radiation trx, and multiple orthopedic surgeries/chronic issues presented to the ED prompted by family members endorsing gait instability, difficulty standing up, and slurred speech. Patient was at a family holiday party when family members noticed these changes. Per patient he arrived at this family party around 4pm and symptoms were noticed when arising from a chair at around 4:30 PM. Patient fell well and wanted to just rest it off, but an EMS family member took the patient's BP and noted systolic 180s. Per ED note patient was last seen well by his wife at 3 PM. Brought into hospital by wife. NIHSS stroke scale in ED 0, and all symptoms showed steady improvement. Upon admission patient with complete symptom resolution, no slurring, normal gait, reports feeling well and endorses no complaints. This constellation of symptoms is a first time for the patient. Upon further discussion, patient reports feelings of unsteadiness in the days leading up to admission as well as an intermittent left sided whooshing sound in his left ear only noticeable in a quiet room. Patient endorses some left sided chest pain in the past several weeks though not on exertion, patient works out 3 times a week with no CP on exertion. Patient denies current CP, SOB, palpitations weakness/numbness/tingling, sensory deficits.     IN THE ED:  Temp  97.3 F , HR 85 ,  / 53 ,RR 17 , SpO2 99 on RA  EKG: NSR. Rate 74. RBBB.  Labs unremarkable  Imaging:   CXR: clear lungs on personal review  CT head non con: No large territory acute infarct, intracranial hemorrhage, or mass effect. Mild chronic microangiopathic white matter changes present.  CTA HEAD/NECK: No large vessel occlusion, aneurysm, dissection, or hemodynamically flow-limiting stenosis present.  CT PERFUSION: Limited by artifact. To the visualized extent, no convincing evidence of core infarct or ischemic penumbra. Subtle patchy ground-glass opacities at the bilateral apices, limited motion artifact, although may represent early infiltrative/infectious process.

## 2022-12-24 NOTE — ED ADULT NURSE NOTE - OBJECTIVE STATEMENT
Pt is A&Ox4, pt presented to the ER for stroke like symptoms at home, loss of balance, tired, weakness, numbness, stroke call called, pt taken to CT, labs drawn as per MD orders, neuro intact, sensation +, motor strength  +, denies any pain at this moment, denies any blurry vision, sob, chest pain, dizziness, headache at this moment, pt appears in nad, resp even and unlabored, will continue to monitor

## 2022-12-24 NOTE — H&P ADULT - ATTENDING COMMENTS
63 y/o M w/ PMHx of osteoarthritis, anxiety, prostate cancer s/p radiation trx, and multiple orthopedic surgeries/chronic issues presented to the ED prompted by family members endorsing patient experiencing gait instability, difficulty standing up, and slurred speech s/p complete symptom resolution admitted for TIA/CVA work-up. Admit to telemetry to monitor for arrhythmia. EKG with RBBB. Neurochecks q4, Check A1C, lipid panel. Check MR brain and 2D ECHO. Neurology consult when available. D/w patient at bedside. Likely TIA - start ASA/statin. Current plan is for MRI and neurology evaluation on Monday 12/26.    Agree with H&P as outlined above, edited where appropriate.

## 2022-12-24 NOTE — ED PROVIDER NOTE - ENMT, MLM
Airway patent, Nasal mucosa clear. Mouth with normal mucosa. Throat has no vesicles, no oropharyngeal exudates and uvula is midline. he speaks with a lisp, but this is not new  per wife speech is unchanged from baseline.

## 2022-12-24 NOTE — H&P ADULT - PROBLEM SELECTOR PLAN 5
Lovenox 40mg qD for DVT prophylaxis          #DISPO  PT consult Lovenox 40mg qD for DVT prophylaxis        #DISPO  PT consult CPAP ordered

## 2022-12-24 NOTE — ED ADULT TRIAGE NOTE - BEFAST ARM SIDE DRIFT
How Severe Are Your Spot(S)?: mild Have Your Spot(S) Been Treated In The Past?: has not been treated Hpi Title: Evaluation of Skin Lesions Location: right anterior proximal upper arm Year Removed: 2015 No

## 2022-12-24 NOTE — H&P ADULT - PROBLEM SELECTOR PLAN 1
Patient with gait instability, difficulty standing up, and slurred speech, sx resolved on admission  CT Head w/o: No large territory acute infarct, intracranial hemorrhage, or mass effect. Mild chronic microangiopathic white matter changes present.  CTA HEAD/NECK: No large vessel occlusion, aneurysm, dissection, or hemodynamically flow-limiting stenosis.  CT PERFUSION: No convincing evidence of core infarct or ischemic penumbra. Subtle patchy ground-glass opacities at the bilateral apices, limited motion artifact, although may represent early infiltrative/infectious process.  EKG on admission NSR. Rate 74. RBBB.  Admit to telemetry for cardiac monitoring to r/o occult arrythmia  MR brain  TTE  Lipid panel   followed by 81mg QD Patient with gait instability, difficulty standing up, and slurred speech, sx resolved on admission  CT Head w/o: No large territory acute infarct, intracranial hemorrhage, or mass effect. Mild chronic microangiopathic white matter changes present.  CTA HEAD/NECK: No large vessel occlusion, aneurysm, dissection, or hemodynamically flow-limiting stenosis.  CT PERFUSION: No convincing evidence of core infarct or ischemic penumbra. Subtle patchy ground-glass opacities at the bilateral apices, limited motion artifact, although may represent early infiltrative/infectious process.  EKG on admission NSR. Rate 74. RBBB.  Admit to telemetry for cardiac monitoring to r/o occult arrythmia  MR brain  TTE  Lipid panel  Atorvastatin 40 QD   now followed by 81mg QD starting tomorrow

## 2022-12-24 NOTE — ED PROVIDER NOTE - NSICDXPASTMEDICALHX_GEN_ALL_CORE_FT
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

## 2022-12-24 NOTE — H&P ADULT - NSICDXPASTSURGICALHX_GEN_ALL_CORE_FT
PAST SURGICAL HISTORY:  Damage to ulnar nerve (Ulnar nerve transposition, 2019)    Fracture of scaphoid of right wrist     S/P ACL reconstruction (Left knee, 2016)    S/P colonoscopy

## 2022-12-24 NOTE — H&P ADULT - NSHPSOCIALHISTORY_GEN_ALL_CORE
Lives at home with wife and 2 children  ADLs: Capable, no limitations  Ambulates independently  Alcohol: 3-4 drinks / month  Tobacco: None/never  Drugs: None/never

## 2022-12-24 NOTE — ED PROVIDER NOTE - OBJECTIVE STATEMENT
lkw 3 pm usha 3 pm   code stroke activated on arrival to er evaluation-  Patient is a 62-year-old male who has history of multiple orthopedic surgeries and issues, no significant past medical history whose primary care physician is Dr. Helton.  He was last seen well by his wife at 3 PM.  She went to a party.  He showed up a few hours later, his speech was off, his gait was off, and he did not feel well.  So she brought him to the emergency room.  Since arrival to the ER his speech has begun to improve, his gait has begun to improve, and the patient states he feels little bit better.  He had no chest pain or shortness of breath.  No weakness no numbness.  No trauma.  No rash.  No history of same.  He takes gabapentin for his nerve pain secondary to his orthopedic injuries.  He takes no blood thinners.  No aspirin.  No recent surgeries.

## 2022-12-24 NOTE — ED PROVIDER NOTE - CLINICAL SUMMARY MEDICAL DECISION MAKING FREE TEXT BOX
62-year-old male on gabapentin for chronic pain, history of anxiety according to medical history, social drinker perhaps 1 drink per day at most.  Found to be unsteady on his feet with slurring of his speech last known well 3 PM.  Only beginning to get better now here in the emergency room.  Differential includes TIA, stroke, arrhythmia, hypotension, metabolic derangement including hypocalcemia hypokalemia.  Rule out anemia.  Rule out head injury rule out neck brain tumor.  Rule out COVID or the flu.  Rule out pneumonia.  We will obtain an emergent CAT scan including CT angio with perfusion.  This imaging will include the carotid Dopplers the brainstem imaging as well as regular brain perfusion.  Laboratory studies troponin chemistries, urinalysis alcohol level chest x-ray EKG.  Case will be discussed with neuroradiology emergently for the results of the CAT scan.

## 2022-12-24 NOTE — H&P ADULT - NSHPREVIEWOFSYSTEMS_GEN_ALL_CORE
Constitutional: denies fever, chills  HEENT: denies headache, dizziness, or lightheadedness  Respiratory: denies SOB, cough, or wheezing  Cardiovascular: denies CP, palpitations  Gastrointestinal: denies nausea, vomiting, diarrhea, constipation, abdominal pain  Skin/Breast: denies rashes or itching  Musculoskeletal: denies weakness  Neurologic: denies loss of sensation, numbness, or tingling  ROS negative except as noted above Constitutional: denies fever, chills  HEENT: denies headache, dizziness, or lightheadedness  Respiratory: denies SOB, cough, or wheezing  Cardiovascular: denies CP, palpitations  Gastrointestinal: denies nausea, vomiting, diarrhea, constipation, abdominal pain  Skin/Breast: denies rashes or itching  Musculoskeletal: denies weakness  Neurologic: denies loss of sensation, numbness, or tingling; neuro symptoms in HPI back to baseline  Psych: no recent changes in mood

## 2022-12-24 NOTE — ED ADULT NURSE NOTE - IS THE PATIENT ABLE TO BE SCREENED?
From: Li Brown  To: Frankie Warner Yes

## 2022-12-24 NOTE — ED PROVIDER NOTE - PROGRESS NOTE DETAILS
dw radiology  no acute hemorrhage infarct thrombus or mass dw pt and family  will accept saba pt and family- results of tests ct cta ctp provided  will accept saba hospitalist

## 2022-12-24 NOTE — H&P ADULT - ASSESSMENT
61 y/o M w/ PMHx of osteoarthritis, anxiety, prostate cancer s/p radiation trx, and multiple orthopedic surgeries/chronic issues presented to the ED prompted by family members endorsing patient experiencing gait instability, difficulty standing up, and slurred speech s/p complete symptom resolution admitted for TIA work-up. 61 y/o M w/ PMHx of osteoarthritis, anxiety, prostate cancer s/p radiation trx, and multiple orthopedic surgeries/chronic issues presented to the ED prompted by family members endorsing patient experiencing gait instability, difficulty standing up, and slurred speech s/p complete symptom resolution admitted for TIA/CVA work-up.

## 2022-12-24 NOTE — H&P ADULT - NSHPPHYSICALEXAM_GEN_ALL_CORE
GENERAL: patient appears well, no acute distress, appropriate, pleasant  EYES: sclera clear, no exudates  ENMT: oropharynx clear without erythema, no exudates, moist mucous membranes  LUNGS: good air entry bilaterally, clear to auscultation, symmetric breath sounds, no wheezing or rhonchi appreciated  HEART: soft S1/S2, regular rate and rhythm, no murmurs noted, no lower extremity edema  GASTROINTESTINAL: abdomen is soft, nontender, nondistended, no palpable masses  INTEGUMENT: Good skin turgor, warm skin, appears well perfused  MUSCULOSKELETAL: no clubbing or cyanosis  NEUROLOGIC: CN II - XII grossly intact. Strength 5/5 b/l upper and lower extremities and equal b/l. No sensory deficits. Speech not slurred.   PSYCHIATRIC: mood is good, affect is congruent, linear and logical thought process  HEME/LYMPH: no obvious ecchymosis or petechiae Vital Signs Last 24 Hrs  T(C): 36.6 (24 Dec 2022 23:27), Max: 36.6 (24 Dec 2022 23:27)  T(F): 97.9 (24 Dec 2022 23:27), Max: 97.9 (24 Dec 2022 23:27)  HR: 75 (24 Dec 2022 23:27) (75 - 85)  BP: 122/68 (24 Dec 2022 23:27) (116/53 - 122/68)  RR: 16 (24 Dec 2022 23:27) (16 - 17)  SpO2: 97% (24 Dec 2022 23:27) (97% - 99%)    GENERAL: patient appears well, no acute distress, appropriate, pleasant  EYES: sclera clear, no exudates  ENMT: oropharynx clear without erythema, no exudates, moist mucous membranes  LUNGS: good air entry bilaterally, clear to auscultation, symmetric breath sounds, no wheezing or rhonchi appreciated  HEART: soft S1/S2, regular rate and rhythm, no murmurs noted, no lower extremity edema  GASTROINTESTINAL: abdomen is soft, nontender, nondistended, no palpable masses  INTEGUMENT: Good skin turgor, warm skin, appears well perfused  MUSCULOSKELETAL: no clubbing or cyanosis  NEUROLOGIC: CN II - XII grossly intact. Strength 5/5 b/l upper and lower extremities and equal b/l. No sensory deficits. Speech not slurred.   PSYCHIATRIC: mood is good, affect is congruent, linear and logical thought process  HEME/LYMPH: no obvious ecchymosis or petechiae

## 2022-12-25 LAB
A1C WITH ESTIMATED AVERAGE GLUCOSE RESULT: 5.5 % — SIGNIFICANT CHANGE UP (ref 4–5.6)
ALBUMIN SERPL ELPH-MCNC: 3.3 G/DL — SIGNIFICANT CHANGE UP (ref 3.3–5)
ALP SERPL-CCNC: 41 U/L — SIGNIFICANT CHANGE UP (ref 40–120)
ALT FLD-CCNC: 15 U/L — SIGNIFICANT CHANGE UP (ref 12–78)
ANION GAP SERPL CALC-SCNC: 6 MMOL/L — SIGNIFICANT CHANGE UP (ref 5–17)
AST SERPL-CCNC: 15 U/L — SIGNIFICANT CHANGE UP (ref 15–37)
BILIRUB SERPL-MCNC: 0.5 MG/DL — SIGNIFICANT CHANGE UP (ref 0.2–1.2)
BUN SERPL-MCNC: 20 MG/DL — SIGNIFICANT CHANGE UP (ref 7–23)
CALCIUM SERPL-MCNC: 8.9 MG/DL — SIGNIFICANT CHANGE UP (ref 8.5–10.1)
CHLORIDE SERPL-SCNC: 110 MMOL/L — HIGH (ref 96–108)
CHOLEST SERPL-MCNC: 173 MG/DL — SIGNIFICANT CHANGE UP
CO2 SERPL-SCNC: 28 MMOL/L — SIGNIFICANT CHANGE UP (ref 22–31)
CREAT SERPL-MCNC: 1 MG/DL — SIGNIFICANT CHANGE UP (ref 0.5–1.3)
EGFR: 86 ML/MIN/1.73M2 — SIGNIFICANT CHANGE UP
ESTIMATED AVERAGE GLUCOSE: 111 MG/DL — SIGNIFICANT CHANGE UP (ref 68–114)
GLUCOSE SERPL-MCNC: 96 MG/DL — SIGNIFICANT CHANGE UP (ref 70–99)
HCT VFR BLD CALC: 38.2 % — LOW (ref 39–50)
HCV AB S/CO SERPL IA: 0.09 S/CO — SIGNIFICANT CHANGE UP (ref 0–0.99)
HCV AB SERPL-IMP: SIGNIFICANT CHANGE UP
HDLC SERPL-MCNC: 37 MG/DL — LOW
HGB BLD-MCNC: 13.3 G/DL — SIGNIFICANT CHANGE UP (ref 13–17)
LIPID PNL WITH DIRECT LDL SERPL: 107 MG/DL — HIGH
MCHC RBC-ENTMCNC: 30.2 PG — SIGNIFICANT CHANGE UP (ref 27–34)
MCHC RBC-ENTMCNC: 34.8 GM/DL — SIGNIFICANT CHANGE UP (ref 32–36)
MCV RBC AUTO: 86.6 FL — SIGNIFICANT CHANGE UP (ref 80–100)
NON HDL CHOLESTEROL: 136 MG/DL — HIGH
NRBC # BLD: 0 /100 WBCS — SIGNIFICANT CHANGE UP (ref 0–0)
PLATELET # BLD AUTO: 176 K/UL — SIGNIFICANT CHANGE UP (ref 150–400)
POTASSIUM SERPL-MCNC: 3.8 MMOL/L — SIGNIFICANT CHANGE UP (ref 3.5–5.3)
POTASSIUM SERPL-SCNC: 3.8 MMOL/L — SIGNIFICANT CHANGE UP (ref 3.5–5.3)
PROT SERPL-MCNC: 6.6 G/DL — SIGNIFICANT CHANGE UP (ref 6–8.3)
RBC # BLD: 4.41 M/UL — SIGNIFICANT CHANGE UP (ref 4.2–5.8)
RBC # FLD: 12.2 % — SIGNIFICANT CHANGE UP (ref 10.3–14.5)
SODIUM SERPL-SCNC: 144 MMOL/L — SIGNIFICANT CHANGE UP (ref 135–145)
TRIGL SERPL-MCNC: 148 MG/DL — SIGNIFICANT CHANGE UP
WBC # BLD: 5.1 K/UL — SIGNIFICANT CHANGE UP (ref 3.8–10.5)
WBC # FLD AUTO: 5.1 K/UL — SIGNIFICANT CHANGE UP (ref 3.8–10.5)

## 2022-12-25 PROCEDURE — 93306 TTE W/DOPPLER COMPLETE: CPT | Mod: 26

## 2022-12-25 PROCEDURE — 99232 SBSQ HOSP IP/OBS MODERATE 35: CPT

## 2022-12-25 RX ORDER — INFLUENZA VIRUS VACCINE 15; 15; 15; 15 UG/.5ML; UG/.5ML; UG/.5ML; UG/.5ML
0.5 SUSPENSION INTRAMUSCULAR ONCE
Refills: 0 | Status: DISCONTINUED | OUTPATIENT
Start: 2022-12-25 | End: 2022-12-26

## 2022-12-25 RX ADMIN — SERTRALINE 50 MILLIGRAM(S): 25 TABLET, FILM COATED ORAL at 11:28

## 2022-12-25 RX ADMIN — GABAPENTIN 300 MILLIGRAM(S): 400 CAPSULE ORAL at 06:56

## 2022-12-25 RX ADMIN — Medication 324 MILLIGRAM(S): at 00:35

## 2022-12-25 RX ADMIN — ATORVASTATIN CALCIUM 40 MILLIGRAM(S): 80 TABLET, FILM COATED ORAL at 21:21

## 2022-12-25 RX ADMIN — GABAPENTIN 300 MILLIGRAM(S): 400 CAPSULE ORAL at 18:47

## 2022-12-25 RX ADMIN — Medication 81 MILLIGRAM(S): at 11:27

## 2022-12-25 RX ADMIN — ENOXAPARIN SODIUM 40 MILLIGRAM(S): 100 INJECTION SUBCUTANEOUS at 06:55

## 2022-12-25 NOTE — PATIENT PROFILE ADULT - STATED REASON FOR ADMISSION
family worried about the way I was speaking and how I looked when I was trying to get up from the chair

## 2022-12-25 NOTE — PROGRESS NOTE ADULT - ASSESSMENT
63 y/o M w/ PMHx of osteoarthritis, anxiety, prostate cancer s/p radiation trx, and multiple orthopedic surgeries/chronic issues presented to the ED prompted by family members endorsing patient experiencing gait instability, difficulty standing up, and slurred speech s/p complete symptom resolution admitted for TIA/CVA work-up.     Transient ischemic attack (TIA):  Patient with gait instability, difficulty standing up, and slurred speech, sx resolved on admission  CT Head w/o: No large territory acute infarct, intracranial hemorrhage, or mass effect. Mild chronic microangiopathic white matter changes present.  CTA HEAD/NECK: No large vessel occlusion, aneurysm, dissection, or hemodynamically flow-limiting stenosis.  CT PERFUSION: No convincing evidence of core infarct or ischemic penumbra. Subtle patchy ground-glass opacities at the bilateral apices, limited motion artifact, although may represent early infiltrative/infectious process.  EKG on admission NSR. Rate 74. RBBB.  MR brain and TTE pending   Lipid panel and Hb a1c is pending   Atorvastatin 40 QD   now followed by 81mg QD starting tomorrow.    Hyperglycemia.   ·  Plan: POC glucose 156 on admission, metabolic panel 120, this am is 96    a1c pending     Osteoarthritis: Home meloxicam w/ therapeutic interchange to celecoxib PRN.       Anxiety: Continue home sertraline.    Obstructive sleep apnea: CPAP ordered.      DVT prophylaxis: Lovenox 40mg qD for DVT prophylaxis    #DISPO  PT consult and Neurology consulted

## 2022-12-25 NOTE — PATIENT PROFILE ADULT - FUNCTIONAL ASSESSMENT - BASIC MOBILITY 6.
4-calculated by average/Not able to assess (calculate score using [facility] averaging method)

## 2022-12-25 NOTE — PROGRESS NOTE ADULT - SUBJECTIVE AND OBJECTIVE BOX
TOMMIE LAM    [id number]    62y      Male    Patient is a 62y old  Male who presents with a chief complaint of TIA (24 Dec 2022 21:38)      INTERVAL HPI/OVERNIGHT EVENTS:    Patient has no more weakness, numbness, blurry vision, nausea, vomiting    REVIEW OF SYSTEMS:    CONSTITUTIONAL: No fever, fatigue  RESPIRATORY: No cough, No shortness of breath  CARDIOVASCULAR: No chest pain, palpitations  GASTROINTESTINAL: No abdominal, No nausea, vomiting  NEUROLOGICAL: No headaches,  loss of strength.  MISCELLANEOUS: No joint swelling or pain       Vital Signs Last 24 Hrs  T(C): 36.3 (25 Dec 2022 04:28), Max: 36.6 (24 Dec 2022 23:27)  T(F): 97.3 (25 Dec 2022 04:28), Max: 97.9 (24 Dec 2022 23:27)  HR: 61 (25 Dec 2022 04:28) (61 - 85)  BP: 127/68 (25 Dec 2022 04:28) (116/53 - 128/82)  RR: 17 (25 Dec 2022 04:28) (16 - 17)  SpO2: 96% (25 Dec 2022 04:28) (96% - 99%)    Parameters below as of 25 Dec 2022 04:28  Patient On (Oxygen Delivery Method): room air        PHYSICAL EXAM:    GENERAL: Elderly male looking   HEENT: PERRL, +EOMI  NECK: soft, Supple, No JVD,   CHEST/LUNG: Clear to auscultate bilaterally; No wheezing  HEART: S1S2+, Regular rate and rhythm; No murmurs  ABDOMEN: Soft, Nontender, Nondistended; Bowel sounds present  EXTREMITIES:  2+ Peripheral Pulses, No edema  SKIN: No rashes or lesions  NEURO: AAOX3, no focal deficits, no motor r sensory loss, no facial droop   PSYCH: normal mood      LABS:                        13.3   5.10  )-----------( 176      ( 25 Dec 2022 07:10 )             38.2     12    144  |  110<H>  |  20  ----------------------------<  96  3.8   |  28  |  1.00    Ca    8.9      25 Dec 2022 07:10    TPro  6.6  /  Alb  3.3  /  TBili  0.5  /  DBili  x   /  AST  15  /  ALT  15  /  AlkPhos  41  12-25    PT/INR - ( 24 Dec 2022 19:25 )   PT: 12.1 sec;   INR: 1.03 ratio         PTT - ( 24 Dec 2022 19:25 )  PTT:36.7 sec  Urinalysis Basic - ( 24 Dec 2022 23:43 )    Color: Pale Yellow / Appearance: Clear / S.010 / pH: x  Gluc: x / Ketone: Negative  / Bili: Negative / Urobili: Negative   Blood: x / Protein: Negative / Nitrite: Negative   Leuk Esterase: Negative / RBC: x / WBC x   Sq Epi: x / Non Sq Epi: x / Bacteria: x          I&O's Summary      MEDICATIONS  (STANDING):  aspirin  chewable 81 milliGRAM(s) Oral daily  atorvastatin 40 milliGRAM(s) Oral at bedtime  enoxaparin Injectable 40 milliGRAM(s) SubCutaneous every 24 hours  gabapentin 300 milliGRAM(s) Oral two times a day  influenza   Vaccine 0.5 milliLiter(s) IntraMuscular once  sertraline 50 milliGRAM(s) Oral daily    MEDICATIONS  (PRN):  celecoxib 200 milliGRAM(s) Oral daily PRN Mild Pain (1 - 3)  ondansetron Injectable 4 milliGRAM(s) IV Push every 8 hours PRN Nausea and/or Vomiting

## 2022-12-26 ENCOUNTER — TRANSCRIPTION ENCOUNTER (OUTPATIENT)
Age: 62
End: 2022-12-26

## 2022-12-26 VITALS
TEMPERATURE: 97 F | HEART RATE: 61 BPM | RESPIRATION RATE: 17 BRPM | OXYGEN SATURATION: 96 % | SYSTOLIC BLOOD PRESSURE: 146 MMHG | DIASTOLIC BLOOD PRESSURE: 84 MMHG

## 2022-12-26 PROCEDURE — 86901 BLOOD TYPING SEROLOGIC RH(D): CPT

## 2022-12-26 PROCEDURE — 85025 COMPLETE CBC W/AUTO DIFF WBC: CPT

## 2022-12-26 PROCEDURE — 36415 COLL VENOUS BLD VENIPUNCTURE: CPT

## 2022-12-26 PROCEDURE — 83036 HEMOGLOBIN GLYCOSYLATED A1C: CPT

## 2022-12-26 PROCEDURE — 71045 X-RAY EXAM CHEST 1 VIEW: CPT

## 2022-12-26 PROCEDURE — 87637 SARSCOV2&INF A&B&RSV AMP PRB: CPT

## 2022-12-26 PROCEDURE — 80307 DRUG TEST PRSMV CHEM ANLYZR: CPT

## 2022-12-26 PROCEDURE — 97162 PT EVAL MOD COMPLEX 30 MIN: CPT

## 2022-12-26 PROCEDURE — 70450 CT HEAD/BRAIN W/O DYE: CPT | Mod: MA

## 2022-12-26 PROCEDURE — 70551 MRI BRAIN STEM W/O DYE: CPT | Mod: 26

## 2022-12-26 PROCEDURE — 85610 PROTHROMBIN TIME: CPT

## 2022-12-26 PROCEDURE — G0378: CPT

## 2022-12-26 PROCEDURE — 93005 ELECTROCARDIOGRAM TRACING: CPT

## 2022-12-26 PROCEDURE — 86900 BLOOD TYPING SEROLOGIC ABO: CPT

## 2022-12-26 PROCEDURE — 70551 MRI BRAIN STEM W/O DYE: CPT

## 2022-12-26 PROCEDURE — 99239 HOSP IP/OBS DSCHRG MGMT >30: CPT

## 2022-12-26 PROCEDURE — 85027 COMPLETE CBC AUTOMATED: CPT

## 2022-12-26 PROCEDURE — 0042T: CPT | Mod: MA

## 2022-12-26 PROCEDURE — 80053 COMPREHEN METABOLIC PANEL: CPT

## 2022-12-26 PROCEDURE — 81003 URINALYSIS AUTO W/O SCOPE: CPT

## 2022-12-26 PROCEDURE — 70498 CT ANGIOGRAPHY NECK: CPT | Mod: MA

## 2022-12-26 PROCEDURE — 70496 CT ANGIOGRAPHY HEAD: CPT | Mod: MA

## 2022-12-26 PROCEDURE — 93306 TTE W/DOPPLER COMPLETE: CPT

## 2022-12-26 PROCEDURE — 99285 EMERGENCY DEPT VISIT HI MDM: CPT

## 2022-12-26 PROCEDURE — 93307 TTE W/O DOPPLER COMPLETE: CPT

## 2022-12-26 PROCEDURE — 82962 GLUCOSE BLOOD TEST: CPT

## 2022-12-26 PROCEDURE — 80061 LIPID PANEL: CPT

## 2022-12-26 PROCEDURE — 86850 RBC ANTIBODY SCREEN: CPT

## 2022-12-26 PROCEDURE — 86803 HEPATITIS C AB TEST: CPT

## 2022-12-26 PROCEDURE — 84484 ASSAY OF TROPONIN QUANT: CPT

## 2022-12-26 PROCEDURE — 85730 THROMBOPLASTIN TIME PARTIAL: CPT

## 2022-12-26 RX ORDER — ATORVASTATIN CALCIUM 80 MG/1
1 TABLET, FILM COATED ORAL
Qty: 30 | Refills: 0
Start: 2022-12-26 | End: 2023-01-24

## 2022-12-26 RX ORDER — SERTRALINE 25 MG/1
1 TABLET, FILM COATED ORAL
Qty: 0 | Refills: 3 | DISCHARGE

## 2022-12-26 RX ORDER — SERTRALINE 25 MG/1
0 TABLET, FILM COATED ORAL
Qty: 0 | Refills: 3 | DISCHARGE

## 2022-12-26 RX ORDER — ASPIRIN/CALCIUM CARB/MAGNESIUM 324 MG
1 TABLET ORAL
Qty: 30 | Refills: 0
Start: 2022-12-26 | End: 2023-01-24

## 2022-12-26 RX ORDER — MELOXICAM 15 MG/1
1 TABLET ORAL
Qty: 0 | Refills: 0 | DISCHARGE

## 2022-12-26 RX ADMIN — SERTRALINE 50 MILLIGRAM(S): 25 TABLET, FILM COATED ORAL at 13:43

## 2022-12-26 RX ADMIN — GABAPENTIN 300 MILLIGRAM(S): 400 CAPSULE ORAL at 06:00

## 2022-12-26 RX ADMIN — Medication 81 MILLIGRAM(S): at 13:43

## 2022-12-26 RX ADMIN — ENOXAPARIN SODIUM 40 MILLIGRAM(S): 100 INJECTION SUBCUTANEOUS at 06:00

## 2022-12-26 NOTE — DISCHARGE NOTE NURSING/CASE MANAGEMENT/SOCIAL WORK - NSDCPEFALRISK_GEN_ALL_CORE
For information on Fall & Injury Prevention, visit: [email or web address] OR  [email or web address] OR  https://www.cdc.gov/steadi/patient.html

## 2022-12-26 NOTE — DISCHARGE NOTE PROVIDER - NSDCMRMEDTOKEN_GEN_ALL_CORE_FT
aspirin 81 mg oral tablet, chewable: 1 tab(s) orally once a day  atorvastatin 20 mg oral tablet: 1 tab(s) orally once a day (at bedtime)   gabapentin 300 mg oral capsule: 300 milligram(s) orally 2 times a day  SERTRALINE  MG TABLET: TAKE 1/2 TABLET BY MOUTH EVERY DAY   aspirin 81 mg oral tablet, chewable: 1 tab(s) orally once a day  atorvastatin 20 mg oral tablet: 1 tab(s) orally once a day (at bedtime)   gabapentin 300 mg oral capsule: 300 milligram(s) orally 2 times a day  SERTRALINE  MG TABLET: 1 tab(s) orally once a day

## 2022-12-26 NOTE — DISCHARGE NOTE PROVIDER - PROVIDER TOKENS
FREE:[LAST:[PMD],PHONE:[(   )    -],FAX:[(   )    -],ADDRESS:[in 1 week, please his office and get an appiontment]]

## 2022-12-26 NOTE — PROGRESS NOTE ADULT - SUBJECTIVE AND OBJECTIVE BOX
Neurology Follow up note    TOMMIE LAM62yMale    HPI:  61 y/o M w/ PMHx of osteoarthritis, anxiety, prostate cancer s/p radiation trx, and multiple orthopedic surgeries/chronic issues presented to the ED prompted by family members endorsing gait instability, difficulty standing up, and slurred speech. Patient was at a family holiday party when family members noticed these changes. Per patient he arrived at this family party around 4pm and symptoms were noticed when arising from a chair at around 4:30 PM. Patient fell well and wanted to just rest it off, but an EMS family member took the patient's BP and noted systolic 180s. Per ED note patient was last seen well by his wife at 3 PM. Brought into hospital by wife. NIHSS stroke scale in ED 0, and all symptoms showed steady improvement. Upon admission patient with complete symptom resolution, no slurring, normal gait, reports feeling well and endorses no complaints. This constellation of symptoms is a first time for the patient. Upon further discussion, patient reports feelings of unsteadiness in the days leading up to admission as well as an intermittent left sided whooshing sound in his left ear only noticeable in a quiet room. Patient endorses some left sided chest pain in the past several weeks though not on exertion, patient works out 3 times a week with no CP on exertion. Patient denies current CP, SOB, palpitations weakness/numbness/tingling, sensory deficits.     IN THE ED:  Temp  97.3 F , HR 85 ,  / 53 ,RR 17 , SpO2 99 on RA  EKG: NSR. Rate 74. RBBB.  Labs unremarkable  Imaging:   CXR: clear lungs on personal review  CT head non con: No large territory acute infarct, intracranial hemorrhage, or mass effect. Mild chronic microangiopathic white matter changes present.  CTA HEAD/NECK: No large vessel occlusion, aneurysm, dissection, or hemodynamically flow-limiting stenosis present.  CT PERFUSION: Limited by artifact. To the visualized extent, no convincing evidence of core infarct or ischemic penumbra. Subtle patchy ground-glass opacities at the bilateral apices, limited motion artifact, although may represent early infiltrative/infectious process.   (24 Dec 2022 21:38)      Interval History -no c/o    Patient is seen, chart was reviewed and case was discussed with the treatment team.  Pt is not in any distress.   Lying on bed comfortably.       Vital Signs Last 24 Hrs  T(C): 36.3 (26 Dec 2022 10:36), Max: 36.7 (25 Dec 2022 22:23)  T(F): 97.4 (26 Dec 2022 10:36), Max: 98 (25 Dec 2022 22:23)  HR: 61 (26 Dec 2022 10:36) (61 - 71)  BP: 146/84 (26 Dec 2022 10:36) (132/66 - 146/84)  BP(mean): --  RR: 17 (26 Dec 2022 10:36) (17 - 17)  SpO2: 96% (26 Dec 2022 10:36) (95% - 97%)    Parameters below as of 26 Dec 2022 10:36  Patient On (Oxygen Delivery Method): room air            REVIEW OF SYSTEMS:    Constitutional: No fever, weight loss or fatigue  Eyes: No eye pain, visual disturbances, or discharge  ENT:  No difficulty hearing, tinnitus, vertigo; No sinus or throat pain  Neck: No pain or stiffness  Respiratory: No cough, wheezing, chills or hemoptysis  Cardiovascular: No chest pain, palpitations, shortness of breath, dizziness or leg swelling  Gastrointestinal: No abdominal or epigastric pain. No nausea, vomiting or hematemesis; No diarrhea or constipation. No melena or hematochezia.  Genitourinary: No dysuria, frequency, hematuria or incontinence  Neurological: No headaches, memory loss, loss of strength, numbness or tremors  Psychiatric: No depression, anxiety, mood swings or difficulty sleeping  Musculoskeletal: No joint pain or swelling; No muscle, back or extremity pain  Skin: No itching, burning, rashes or lesions   Lymph Nodes: No enlarged glands  Endocrine: No heat or cold intolerance; No hair loss, No h/o diabetes or thyroid dysfunction  Allergy and Immunologic: No hives or eczema    On Neurological Examination:    Mental Status - Pt is alert, awake, oriented X3. . Follows commands well and able to answer questions appropriately.Mood and affect  normal    Speech -  Normal.     Cranial Nerves - Pupils 3 mm equal and reactive to light, extraocular eye movements intact. Pt has no visual field deficit.  Pt has no r facial asymmetry. Facial sensation is intact.Tongue - is in midline.    Muscle tone - is normal all over. Moves all extremities equally. No asymmetry is seen.      Motor Exam - 5/5 all over, No drift. No shaking or tremors.    Sensory Exam - Pin prick, temperature, joint position and vibration are intact on either side. Pt withdraws all extremities equally on stimulation. No asymmetry seen. No complaints of tingling, numbness.    Gait - Able to stand and walk unassisted.    coordination:    Finger to nose: [place]    Deep tendon Reflexes - 2 plus all over.        Romberg - Negative.    Neck Supple -  Yes.     MEDICATIONS    aspirin  chewable 81 milliGRAM(s) Oral daily  atorvastatin 40 milliGRAM(s) Oral at bedtime  celecoxib 200 milliGRAM(s) Oral daily PRN  enoxaparin Injectable 40 milliGRAM(s) SubCutaneous every 24 hours  gabapentin 300 milliGRAM(s) Oral two times a day  influenza   Vaccine 0.5 milliLiter(s) IntraMuscular once  ondansetron Injectable 4 milliGRAM(s) IV Push every 8 hours PRN  sertraline 50 milliGRAM(s) Oral daily      Allergies    No Known Allergies    Intolerances        LABS:  CBC Full  -  ( 25 Dec 2022 07:10 )  WBC Count : 5.10 K/uL  RBC Count : 4.41 M/uL  Hemoglobin : 13.3 g/dL  Hematocrit : 38.2 %  Platelet Count - Automated : 176 K/uL  Mean Cell Volume : 86.6 fl  Mean Cell Hemoglobin : 30.2 pg  Mean Cell Hemoglobin Concentration : 34.8 gm/dL  Auto Neutrophil # : x      Urinalysis Basic - ( 24 Dec 2022 23:43 )    Color: Pale Yellow / Appearance: Clear / S.010 / pH: x  Gluc: x / Ketone: Negative  / Bili: Negative / Urobili: Negative   Blood: x / Protein: Negative / Nitrite: Negative   Leuk Esterase: Negative / RBC: x / WBC x   Sq Epi: x / Non Sq Epi: x / Bacteria: x          144  |  110<H>  |  20  ----------------------------<  96  3.8   |  28  |  1.00    Ca    8.9      25 Dec 2022 07:10    TPro  6.6  /  Alb  3.3  /  TBili  0.5  /  DBili  x   /  AST  15  /  ALT  15  /  AlkPhos  41  -    Hemoglobin A1C:     Vitamin B12     RADIOLOGY    ASSESSMENT AND PLAN:     seen for slurred speech/unsteady gait  likely tia     dc home   on ysabel/statin  neuro follow up as out patient   management dw dr torre

## 2022-12-26 NOTE — PHYSICAL THERAPY INITIAL EVALUATION ADULT - ADDITIONAL COMMENTS
Patient lives with wife in private home, +CRISTIAN and in home. Patient was independent in all ADLs and ambulates independently without device.

## 2022-12-26 NOTE — PHYSICAL THERAPY INITIAL EVALUATION ADULT - PERTINENT HX OF CURRENT PROBLEM, REHAB EVAL
61 y/o M w/ PMHx of osteoarthritis, anxiety, prostate cancer s/p radiation trx, and multiple orthopedic surgeries/chronic issues presented to the ED prompted by family members endorsing gait instability, difficulty standing up, and slurred speech. Patient was at a family holiday party when family members noticed these changes. Per patient he arrived at this family party around 4pm and symptoms were noticed when arising from a chair at around 4:30 PM. Patient fell well and wanted to just rest it off, but an EMS family member took the patient's BP and noted systolic 180s

## 2022-12-26 NOTE — DISCHARGE NOTE NURSING/CASE MANAGEMENT/SOCIAL WORK - PATIENT PORTAL LINK FT
You can access the FollowMyHealth Patient Portal offered by [facility] by registering at the following website: [email or web address]. By joining [employer]’s FollowMyHealth portal, you will also be able to view your health information using other applications (apps) compatible with our system.

## 2022-12-26 NOTE — DISCHARGE NOTE PROVIDER - HOSPITAL COURSE
63 y/o M with Hx of osteoarthritis, anxiety, prostate cancer s/p radiation trx, and multiple orthopedic surgeries/chronic issues presented to the ED prompted by family members endorsing patient experiencing gait instability, difficulty standing up, and slurred speech s/p complete symptom resolution admitted for TIA/CVA work-up.  CT Head w/o: No large territory acute infarct, intracranial hemorrhage, or mass effect. Mild chronic microangiopathic white matter changes present.  CTA HEAD/NECK: No large vessel occlusion, aneurysm, dissection, or hemodynamically flow-limiting stenosis.  CT PERFUSION: No convincing evidence of core infarct or ischemic penumbra. Subtle patchy ground-glass opacities at the bilateral apices, limited motion artifact, although may represent early infiltrative/infectious process, EKG on admission NSR. Rate 74. RBBB.  MR brain showed no acute findings and TTE done   Lipid panel showed LDL of 107 and Hb a1c  5.5, will change atorvastatin to 20mg daily, continued with aspirin 81mg daily.  His neuro checks done over the course showed no neurological deficit, patient was seen and followed by Neurology.  patient might need out patient holter monitoring, might need get out patient cardiology eval for holter placement   for his Anxiety, he was continued with sertraline.    Patient remained asymptomatic over the course, he is being discharge symptoms free.     Vital Signs Last 24 Hrs  T(C): 36.3 (26 Dec 2022 10:36), Max: 36.7 (25 Dec 2022 22:23)  T(F): 97.4 (26 Dec 2022 10:36), Max: 98 (25 Dec 2022 22:23)  HR: 61 (26 Dec 2022 10:36) (61 - 71)  BP: 146/84 (26 Dec 2022 10:36) (132/66 - 146/84)  BP(mean): --  RR: 17 (26 Dec 2022 10:36) (17 - 17)  SpO2: 96% (26 Dec 2022 10:36) (95% - 97%)    Parameters below as of 26 Dec 2022 10:36  Patient On (Oxygen Delivery Method): room air        PHYSICAL EXAM:    GENERAL: Middle age male looking comfortable   HEENT: PERRL, +EOMI  NECK: soft, Supple, No JVD,   CHEST/LUNG: Clear to auscultate bilaterally; No wheezing  HEART: S1S2+, Regular rate and rhythm; No murmurs  ABDOMEN: Soft, Nontender, Nondistended; Bowel sounds present  EXTREMITIES:  1+ Peripheral Pulses, No edema  SKIN: No rashes or lesions  NEURO: AAOX3, no focal deficits, no motor r sensory loss  PSYCH: normal mood           61 y/o M with Hx of osteoarthritis, anxiety, prostate cancer s/p radiation trx, and multiple orthopedic surgeries/chronic issues presented to the ED prompted by family members endorsing patient experiencing gait instability, difficulty standing up, and slurred speech s/p complete symptom resolution admitted for TIA/CVA work-up.  CT Head w/o: No large territory acute infarct, intracranial hemorrhage, or mass effect. Mild chronic microangiopathic white matter changes present.  CTA HEAD/NECK: No large vessel occlusion, aneurysm, dissection, or hemodynamically flow-limiting stenosis.  CT PERFUSION: No convincing evidence of core infarct or ischemic penumbra. Subtle patchy ground-glass opacities at the bilateral apices, limited motion artifact, although may represent early infiltrative/infectious process, EKG on admission NSR. Rate 74. RBBB.  MR brain showed no acute findings and TTE done looks ok with normal LV and normal EF.   Lipid panel showed LDL of 107 and Hb a1c  5.5, will change atorvastatin to 20mg daily, continued with aspirin 81mg daily.  His neuro checks done over the course showed no neurological deficit, patient was seen and followed by Neurology.  patient might need out patient holter monitoring, might need get out patient cardiology eval for holter placement   for his Anxiety, he was continued with sertraline.    Patient remained asymptomatic over the course, he is being discharge symptoms free.     Vital Signs Last 24 Hrs  T(C): 36.3 (26 Dec 2022 10:36), Max: 36.7 (25 Dec 2022 22:23)  T(F): 97.4 (26 Dec 2022 10:36), Max: 98 (25 Dec 2022 22:23)  HR: 61 (26 Dec 2022 10:36) (61 - 71)  BP: 146/84 (26 Dec 2022 10:36) (132/66 - 146/84)  BP(mean): --  RR: 17 (26 Dec 2022 10:36) (17 - 17)  SpO2: 96% (26 Dec 2022 10:36) (95% - 97%)    Parameters below as of 26 Dec 2022 10:36  Patient On (Oxygen Delivery Method): room air        PHYSICAL EXAM:    GENERAL: Middle age male looking comfortable   HEENT: PERRL, +EOMI  NECK: soft, Supple, No JVD,   CHEST/LUNG: Clear to auscultate bilaterally; No wheezing  HEART: S1S2+, Regular rate and rhythm; No murmurs  ABDOMEN: Soft, Nontender, Nondistended; Bowel sounds present  EXTREMITIES:  1+ Peripheral Pulses, No edema  SKIN: No rashes or lesions  NEURO: AAOX3, no focal deficits, no motor r sensory loss  PSYCH: normal mood

## 2022-12-26 NOTE — DISCHARGE NOTE PROVIDER - CARE PROVIDER_API CALL
PMD,   in 1 week, please his office and get an appiontment  Phone: (   )    -  Fax: (   )    -  Follow Up Time:

## 2023-03-07 NOTE — ED ADULT TRIAGE NOTE - NS ED TRIAGE AVPU SCALE
Procedure     ECG 12 Lead    Date/Time: 3/7/2023 1:17 PM  Performed by: Claudia Chilel MD  Authorized by: Claudia Chilel MD   Comparison: compared with previous ECG from 1/27/2023  Comparison to previous ECG: Sinus Now  Rhythm: sinus rhythm  Rate: normal  QRS axis: normal  Other findings: low voltage    Clinical impression: non-specific ECG              Alert-The patient is alert, awake and responds to voice. The patient is oriented to time, place, and person. The triage nurse is able to obtain subjective information.

## 2023-08-21 NOTE — ED ADULT NURSE NOTE - NSICDXPASTMEDICALHX_GEN_ALL_CORE_FT
Quality 226: Preventive Care And Screening: Tobacco Use: Screening And Cessation Intervention: Patient screened for tobacco use and is an ex/non-smoker
Detail Level: Detailed
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

## 2023-11-22 ENCOUNTER — NON-APPOINTMENT (OUTPATIENT)
Age: 63
End: 2023-11-22

## 2024-03-19 NOTE — ED PROVIDER NOTE - IV ALTEPLASE INCLUSION HIDDEN
Detail Level: Detailed Quality 410: Psoriasis Clinical Response To Oral Systemic Or Biologic Medications: Psoriasis Assessment Tool Documented, Met Specified Benchmark show Quality 47: Advance Care Plan: Advance Care Planning discussed and documented in the medical record; patient did not wish or was not able to name a surrogate decision maker or provide an advance care plan. Quality 226: Preventive Care And Screening: Tobacco Use: Screening And Cessation Intervention: Patient screened for tobacco use and is an ex/non-smoker Quality 130: Documentation Of Current Medications In The Medical Record: Current Medications Documented Quality 431: Preventive Care And Screening: Unhealthy Alcohol Use - Screening: Patient not identified as an unhealthy alcohol user when screened for unhealthy alcohol use using a systematic screening method Quality 176: Tuberculosis Screening Prior To First Course Of Biologic And/Or Immune Response Modifier Therapy: Patient receiving first-time biologic and/or immune response modifier therapy, TB Screening Performed and Results Interpreted within 12 months Quality 485: Psoriasis - Improvement In Patient-Reported Itch Severity: Itch severity assessment score is reduced by 3 or more points from the initial (index) assessment score to the follow-up visit score

## 2024-04-03 NOTE — ED ADULT TRIAGE NOTE - NS ED TRIAGE CLINICAL UPGRADE
Really hard to say without seen her for an evaluation.    Reviewed imaging of neck relatively benign    If continues to feel like it is swollen make an appointment with me so I can look at it   Pain - Patient was clinically upgraded due to pain.

## 2024-05-24 ENCOUNTER — APPOINTMENT (OUTPATIENT)
Dept: ORTHOPEDIC SURGERY | Facility: CLINIC | Age: 64
End: 2024-05-24
Payer: COMMERCIAL

## 2024-05-24 DIAGNOSIS — M25.562 PAIN IN LEFT KNEE: ICD-10-CM

## 2024-05-24 DIAGNOSIS — S83.92XA SPRAIN OF UNSPECIFIED SITE OF LEFT KNEE, INITIAL ENCOUNTER: ICD-10-CM

## 2024-05-24 PROCEDURE — 73562 X-RAY EXAM OF KNEE 3: CPT | Mod: LT

## 2024-05-24 PROCEDURE — 99203 OFFICE O/P NEW LOW 30 MIN: CPT

## 2024-05-24 RX ORDER — DICLOFENAC SODIUM 75 MG/1
75 TABLET, DELAYED RELEASE ORAL
Qty: 60 | Refills: 2 | Status: ACTIVE | COMMUNITY
Start: 2024-05-24 | End: 1900-01-01

## 2024-05-24 RX ORDER — OXYCODONE AND ACETAMINOPHEN 5; 325 MG/1; MG/1
5-325 TABLET ORAL
Qty: 10 | Refills: 0 | Status: ACTIVE | COMMUNITY
Start: 2024-05-24 | End: 1900-01-01

## 2024-05-24 NOTE — ASSESSMENT
[FreeTextEntry1] : recurrent left knee pain for a few months but worse after biking since early 5/15/24.  history of 2 left knee scope and 1 acl recon with allograft all done by dr lim - patient was doing okay until 5/15/24.  prominent fibular head. unclear if just swelling. do not see fracture.  some pain with varus stress but alot of guaring.  possible lcl avulsion.  also some peroneal sensory nerve issues. active peroneal ok now.  tib fib joint look ok on xray.  retired.

## 2024-05-24 NOTE — DISCUSSION/SUMMARY
[de-identified] : hinged brace. stat mri left knee to assess. crutches prn. ice. nsaids. short term meds.

## 2024-05-24 NOTE — PHYSICAL EXAM
[Left] : left knee [NL (0)] : extension 0 degrees [4___] : hamstring 4[unfilled]/5 [] : antalgic [FreeTextEntry3] : swelling over proximal fibula/ fibular head [FreeTextEntry8] : also anterolateral tibia [de-identified] : able to slr [de-identified] : alot of guarding. equivocal lachsman pivot shift testing.  some pain with varus stress testing. [de-identified] : some tingling dorsal foot but active motion ok. [TWNoteComboBox7] : flexion 110 degrees

## 2024-05-24 NOTE — HISTORY OF PRESENT ILLNESS
[Left Leg] : left leg [Gradual] : gradual [7] : 7 [Stabbing] : stabbing [Intermittent] : intermittent [Rest] : rest [Exercising] : exercising [Retired] : Work status: retired [de-identified] : 5/24/24:  recurrent left knee pain for a few months but worse after biking since 5/15/24.   [] : Post Surgical Visit: no [FreeTextEntry5] : Pt initially was cycling up a hill Wednesday and felt a pop in his left knee and has noticed a lump on the lateral side of his knee as well as pain and swelling since. pt has a hx of a non traumatic meniscus repair done 10 years ago, pt re tore his meniscus a year later and had it repaired. 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

## 2024-05-25 ENCOUNTER — APPOINTMENT (OUTPATIENT)
Dept: MRI IMAGING | Facility: CLINIC | Age: 64
End: 2024-05-25
Payer: COMMERCIAL

## 2024-05-25 PROCEDURE — 73721 MRI JNT OF LWR EXTRE W/O DYE: CPT | Mod: LT

## 2024-05-28 ENCOUNTER — APPOINTMENT (OUTPATIENT)
Dept: ORTHOPEDIC SURGERY | Facility: CLINIC | Age: 64
End: 2024-05-28
Payer: COMMERCIAL

## 2024-05-28 DIAGNOSIS — S83.8X2D SPRAIN OF OTHER SPECIFIED PARTS OF LEFT KNEE, SUBSEQUENT ENCOUNTER: ICD-10-CM

## 2024-05-28 DIAGNOSIS — M17.12 UNILATERAL PRIMARY OSTEOARTHRITIS, LEFT KNEE: ICD-10-CM

## 2024-05-28 DIAGNOSIS — M25.862 OTHER SPECIFIED JOINT DISORDERS, LEFT KNEE: ICD-10-CM

## 2024-05-28 PROCEDURE — 99214 OFFICE O/P EST MOD 30 MIN: CPT | Mod: 25

## 2024-05-28 PROCEDURE — 20605 DRAIN/INJ JOINT/BURSA W/O US: CPT | Mod: LT

## 2024-05-28 NOTE — PROCEDURE
[Medium Joint Injection] : medium joint injection [Left] : of the left [Other: ____] : [unfilled] [Pain] : pain [Inflammation] : inflammation [Alcohol] : alcohol [Betadine] : betadine [] : Patient tolerated procedure well [Call if redness, pain or fever occur] : call if redness, pain or fever occur [Apply ice for 15min out of every hour for the next 12-24 hours as tolerated] : apply ice for 15 minutes out of every hour for the next 12-24 hours as tolerated [Patient was advised to rest the joint(s) for ____ days] : patient was advised to rest the joint(s) for [unfilled] days [Previous OTC use and PT nontherapeutic] : patient has tried OTC's including aspirin, Ibuprofen, Aleve, etc or prescription NSAIDS, and/or exercises at home and/or physical therapy without satisfactory response [Patient had decreased mobility in the joint] : patient had decreased mobility in the joint [Risks, benefits, alternatives discussed / Verbal consent obtained] : the risks benefits, and alternatives have been discussed, and verbal consent was obtained [de-identified] : 3 cc [de-identified] : yellow ganglion like fluid.

## 2024-05-28 NOTE — ASSESSMENT
[FreeTextEntry1] : recurrent left knee pain for a few months but worse after biking since early 5/15/24.  history of 2 left knee scope and 1 acl recon with allograft all done by dr lim - patient was doing okay until 5/15/24. mri report shows mmt and oa but also see fluid collection near tib fib joint.  no comment on report.    has some sensory peroneal symptoms.  aspiration revealed 3 cc of yellow ganglion like fluid.  possibly from tib fib joint.  retired.

## 2024-05-28 NOTE — HISTORY OF PRESENT ILLNESS
[Left Leg] : left leg [Gradual] : gradual [7] : 7 [Stabbing] : stabbing [Intermittent] : intermittent [Rest] : rest [Exercising] : exercising [Retired] : Work status: retired [de-identified] : 5/24/24:  recurrent left knee pain for a few months but worse after biking since 5/15/24.   5/28/24:  left knee pain persists.  back to review mri. [] : Post Surgical Visit: no [FreeTextEntry5] : Pt initially was cycling up a hill Wednesday and felt a pop in his left knee and has noticed a lump on the lateral side of his knee as well as pain and swelling since. pt has a hx of a non traumatic meniscus repair done 10 years ago, pt re tore his meniscus a year later and had it repaired. 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

## 2024-05-28 NOTE — PHYSICAL EXAM
[Left] : left knee [NL (0)] : extension 0 degrees [4___] : hamstring 4[unfilled]/5 [] : no erythema [FreeTextEntry3] : swelling over proximal fibula/ fibular head [FreeTextEntry8] : also anterolateral tibia [de-identified] : able to slr [de-identified] : alot of guarding. equivocal lachsman pivot shift testing.  some pain with varus stress testing. [de-identified] : some tingling dorsal foot but active motion ok. [TWNoteComboBox7] : flexion 110 degrees

## 2025-05-02 ENCOUNTER — NON-APPOINTMENT (OUTPATIENT)
Age: 65
End: 2025-05-02